# Patient Record
Sex: MALE | Race: WHITE | NOT HISPANIC OR LATINO | URBAN - METROPOLITAN AREA
[De-identification: names, ages, dates, MRNs, and addresses within clinical notes are randomized per-mention and may not be internally consistent; named-entity substitution may affect disease eponyms.]

---

## 2017-03-05 ENCOUNTER — EMERGENCY (EMERGENCY)
Facility: HOSPITAL | Age: 52
LOS: 1 days | Discharge: HOME | End: 2017-03-05
Admitting: EMERGENCY MEDICINE
Payer: MEDICARE
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
## 2017-03-05 DIAGNOSIS — M77.32 CALCANEAL SPUR, LEFT FOOT: Chronic | ICD-10-CM

## 2017-03-05 DIAGNOSIS — F32.9 MAJOR DEPRESSIVE DISORDER, SINGLE EPISODE, UNSPECIFIED: ICD-10-CM
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
## 2017-03-05 DIAGNOSIS — S30.861A INSECT BITE (NONVENOMOUS) OF ABDOMINAL WALL, INITIAL ENCOUNTER: ICD-10-CM
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
## 2017-03-05 PROCEDURE — 99283 EMERGENCY DEPT VISIT LOW MDM: CPT

## 2017-03-27 ENCOUNTER — APPOINTMENT (OUTPATIENT)
Dept: RADIOLOGY | Facility: HOSPITAL | Age: 52
End: 2017-03-27

## 2017-03-27 ENCOUNTER — OUTPATIENT (OUTPATIENT)
Dept: OUTPATIENT SERVICES | Facility: HOSPITAL | Age: 52
LOS: 1 days | End: 2017-03-27
Payer: MEDICARE

## 2017-03-27 DIAGNOSIS — M77.32 CALCANEAL SPUR, LEFT FOOT: Chronic | ICD-10-CM

## 2017-03-27 DIAGNOSIS — N20.0 CALCULUS OF KIDNEY: ICD-10-CM

## 2017-03-27 PROCEDURE — 74018 RADEX ABDOMEN 1 VIEW: CPT

## 2017-07-08 ENCOUNTER — EMERGENCY (EMERGENCY)
Facility: HOSPITAL | Age: 52
LOS: 1 days | Discharge: HOME | End: 2017-07-08
Admitting: EMERGENCY MEDICINE
Payer: MEDICARE

## 2017-07-08 DIAGNOSIS — Z79.899 OTHER LONG TERM (CURRENT) DRUG THERAPY: ICD-10-CM

## 2017-07-08 DIAGNOSIS — F32.9 MAJOR DEPRESSIVE DISORDER, SINGLE EPISODE, UNSPECIFIED: ICD-10-CM

## 2017-07-08 DIAGNOSIS — R10.9 UNSPECIFIED ABDOMINAL PAIN: ICD-10-CM

## 2017-07-08 DIAGNOSIS — M77.32 CALCANEAL SPUR, LEFT FOOT: Chronic | ICD-10-CM

## 2017-07-08 DIAGNOSIS — N20.2 CALCULUS OF KIDNEY WITH CALCULUS OF URETER: ICD-10-CM

## 2017-07-08 DIAGNOSIS — Z79.891 LONG TERM (CURRENT) USE OF OPIATE ANALGESIC: ICD-10-CM

## 2017-07-08 PROCEDURE — 99284 EMERGENCY DEPT VISIT MOD MDM: CPT

## 2017-07-08 PROCEDURE — 74176 CT ABD & PELVIS W/O CONTRAST: CPT | Mod: 26

## 2017-07-09 PROCEDURE — 87086 URINE CULTURE/COLONY COUNT: CPT

## 2017-07-09 PROCEDURE — 85027 COMPLETE CBC AUTOMATED: CPT

## 2017-07-09 PROCEDURE — 74176 CT ABD & PELVIS W/O CONTRAST: CPT

## 2017-07-09 PROCEDURE — 82550 ASSAY OF CK (CPK): CPT

## 2017-07-09 PROCEDURE — 96361 HYDRATE IV INFUSION ADD-ON: CPT

## 2017-07-09 PROCEDURE — 96365 THER/PROPH/DIAG IV INF INIT: CPT

## 2017-07-09 PROCEDURE — 99284 EMERGENCY DEPT VISIT MOD MDM: CPT | Mod: 25

## 2017-07-09 PROCEDURE — 81003 URINALYSIS AUTO W/O SCOPE: CPT

## 2017-07-09 PROCEDURE — 96375 TX/PRO/DX INJ NEW DRUG ADDON: CPT

## 2017-07-09 PROCEDURE — 85730 THROMBOPLASTIN TIME PARTIAL: CPT

## 2017-07-09 PROCEDURE — 85610 PROTHROMBIN TIME: CPT

## 2017-07-09 PROCEDURE — 80053 COMPREHEN METABOLIC PANEL: CPT

## 2017-07-14 ENCOUNTER — APPOINTMENT (OUTPATIENT)
Dept: RADIOLOGY | Facility: HOSPITAL | Age: 52
End: 2017-07-14

## 2017-07-14 ENCOUNTER — OUTPATIENT (OUTPATIENT)
Dept: OUTPATIENT SERVICES | Facility: HOSPITAL | Age: 52
LOS: 1 days | End: 2017-07-14
Payer: MEDICARE

## 2017-07-14 DIAGNOSIS — M77.32 CALCANEAL SPUR, LEFT FOOT: Chronic | ICD-10-CM

## 2017-07-14 PROCEDURE — 74018 RADEX ABDOMEN 1 VIEW: CPT

## 2017-07-31 ENCOUNTER — OUTPATIENT (OUTPATIENT)
Dept: OUTPATIENT SERVICES | Facility: HOSPITAL | Age: 52
LOS: 1 days | End: 2017-07-31
Payer: MEDICARE

## 2017-07-31 DIAGNOSIS — M77.32 CALCANEAL SPUR, LEFT FOOT: Chronic | ICD-10-CM

## 2017-07-31 PROCEDURE — G0463: CPT

## 2017-07-31 PROCEDURE — 93010 ELECTROCARDIOGRAM REPORT: CPT | Mod: NC

## 2017-07-31 PROCEDURE — 93005 ELECTROCARDIOGRAM TRACING: CPT

## 2017-07-31 PROCEDURE — 80048 BASIC METABOLIC PNL TOTAL CA: CPT

## 2017-07-31 PROCEDURE — 81003 URINALYSIS AUTO W/O SCOPE: CPT

## 2017-07-31 PROCEDURE — 87086 URINE CULTURE/COLONY COUNT: CPT

## 2017-07-31 PROCEDURE — 36415 COLL VENOUS BLD VENIPUNCTURE: CPT

## 2017-07-31 PROCEDURE — 85025 COMPLETE CBC W/AUTO DIFF WBC: CPT

## 2017-08-08 ENCOUNTER — OUTPATIENT (OUTPATIENT)
Dept: OUTPATIENT SERVICES | Facility: HOSPITAL | Age: 52
LOS: 1 days | Discharge: HOME | End: 2017-08-08
Payer: MEDICARE

## 2017-08-08 DIAGNOSIS — N20.1 CALCULUS OF URETER: ICD-10-CM

## 2017-08-08 DIAGNOSIS — M77.32 CALCANEAL SPUR, LEFT FOOT: Chronic | ICD-10-CM

## 2017-08-08 PROCEDURE — 52005 CYSTO W/URTRL CATHJ: CPT | Mod: LT

## 2017-08-08 PROCEDURE — 74018 RADEX ABDOMEN 1 VIEW: CPT

## 2017-08-08 PROCEDURE — 74000: CPT | Mod: 26

## 2017-08-09 ENCOUNTER — TRANSCRIPTION ENCOUNTER (OUTPATIENT)
Age: 52
End: 2017-08-09

## 2018-08-31 ENCOUNTER — APPOINTMENT (OUTPATIENT)
Dept: ORTHOPEDIC SURGERY | Facility: CLINIC | Age: 53
End: 2018-08-31
Payer: MEDICARE

## 2018-08-31 VITALS
BODY MASS INDEX: 21.67 KG/M2 | SYSTOLIC BLOOD PRESSURE: 114 MMHG | HEART RATE: 66 BPM | WEIGHT: 160 LBS | TEMPERATURE: 98 F | DIASTOLIC BLOOD PRESSURE: 74 MMHG | HEIGHT: 72 IN

## 2018-08-31 DIAGNOSIS — Z78.9 OTHER SPECIFIED HEALTH STATUS: ICD-10-CM

## 2018-08-31 DIAGNOSIS — I49.3 VENTRICULAR PREMATURE DEPOLARIZATION: ICD-10-CM

## 2018-08-31 DIAGNOSIS — Z80.9 FAMILY HISTORY OF MALIGNANT NEOPLASM, UNSPECIFIED: ICD-10-CM

## 2018-08-31 PROCEDURE — 99204 OFFICE O/P NEW MOD 45 MIN: CPT

## 2018-08-31 PROCEDURE — 73610 X-RAY EXAM OF ANKLE: CPT | Mod: LT

## 2018-09-24 ENCOUNTER — APPOINTMENT (OUTPATIENT)
Dept: ORTHOPEDIC SURGERY | Facility: CLINIC | Age: 53
End: 2018-09-24
Payer: MEDICARE

## 2018-09-24 PROCEDURE — 99213 OFFICE O/P EST LOW 20 MIN: CPT

## 2018-09-24 PROCEDURE — 73610 X-RAY EXAM OF ANKLE: CPT | Mod: LT

## 2018-11-13 ENCOUNTER — APPOINTMENT (OUTPATIENT)
Dept: FAMILY MEDICINE | Facility: CLINIC | Age: 53
End: 2018-11-13
Payer: MEDICARE

## 2018-11-13 VITALS
SYSTOLIC BLOOD PRESSURE: 101 MMHG | RESPIRATION RATE: 17 BRPM | HEIGHT: 75 IN | DIASTOLIC BLOOD PRESSURE: 64 MMHG | WEIGHT: 159 LBS | TEMPERATURE: 98 F | BODY MASS INDEX: 19.77 KG/M2 | HEART RATE: 68 BPM | OXYGEN SATURATION: 97 %

## 2018-11-13 DIAGNOSIS — Z80.8 FAMILY HISTORY OF MALIGNANT NEOPLASM OF OTHER ORGANS OR SYSTEMS: ICD-10-CM

## 2018-11-13 DIAGNOSIS — Z80.0 FAMILY HISTORY OF MALIGNANT NEOPLASM OF DIGESTIVE ORGANS: ICD-10-CM

## 2018-11-13 PROCEDURE — 99203 OFFICE O/P NEW LOW 30 MIN: CPT

## 2018-11-13 NOTE — HISTORY OF PRESENT ILLNESS
[FreeTextEntry8] : here to establish pcp\par sees dr. Whitney, has anxiety , insomnia, has him write klonopin\par concerend as his joints are achy , left shoulder, right shoulder, left ankle, right elbow\par for many years, has seen orthopedics in past however still has joint pain over the years\par currently left ankle pain and left elbow pain is worse\par hurt left ankle playing ball 32 years ago, had surgery may years ago, but still has pain on and off \par wants information on [place] doctor who uses alternative treatment for scar tissue repair

## 2018-11-13 NOTE — PHYSICAL EXAM
[No Acute Distress] : no acute distress [Well Nourished] : well nourished [Well Developed] : well developed [Normal Sclera/Conjunctiva] : normal sclera/conjunctiva [PERRL] : pupils equal round and reactive to light [EOMI] : extraocular movements intact [Normal Outer Ear/Nose] : the outer ears and nose were normal in appearance [No Joint Swelling] : no joint swelling [Grossly Normal Strength/Tone] : grossly normal strength/tone [de-identified] : left ankle from, no erytjhema, brusing or deformity. left elbow from no point tenderness joint deformity noted

## 2018-11-13 NOTE — ASSESSMENT
[FreeTextEntry1] : advised patient to follow up for consult on platelet transfusion as recommeneded by ortho\par trial of glucosamine/chondroitan\par cont PT as per ortho and stretching exercises at home \par routine labs, follow up for cpe\par should see psych for renewals of klonopin etc

## 2018-11-15 ENCOUNTER — EMERGENCY (EMERGENCY)
Facility: HOSPITAL | Age: 53
LOS: 1 days | Discharge: HOME | End: 2018-11-15
Attending: EMERGENCY MEDICINE | Admitting: EMERGENCY MEDICINE
Payer: MEDICARE

## 2018-11-15 VITALS
OXYGEN SATURATION: 98 % | HEART RATE: 73 BPM | TEMPERATURE: 98 F | RESPIRATION RATE: 16 BRPM | SYSTOLIC BLOOD PRESSURE: 101 MMHG | WEIGHT: 160.06 LBS | DIASTOLIC BLOOD PRESSURE: 56 MMHG

## 2018-11-15 DIAGNOSIS — M77.32 CALCANEAL SPUR, LEFT FOOT: Chronic | ICD-10-CM

## 2018-11-15 PROCEDURE — 99283 EMERGENCY DEPT VISIT LOW MDM: CPT

## 2018-11-15 PROCEDURE — 99282 EMERGENCY DEPT VISIT SF MDM: CPT

## 2018-11-15 NOTE — ED PROVIDER NOTE - OBJECTIVE STATEMENT
54 yo with history of depression and now insomnia over the last few years worse the last week or so.  States he has been prescribed Klonopin and Trazadone without improvement although has only taken sporadically.  Denies any change in life.  Able to function during day and is not tired but does not like being up all night.  Denies any impulsiveness or problems in his personal life.  No SI HI AH VH.  No drug use.

## 2018-11-15 NOTE — ED PROVIDER NOTE - MEDICAL DECISION MAKING DETAILS
pt with insomnia.  Could be related to being in a hypomanic state relating to his underlying psych disease.  Does not pose threat to self or others and already has a psych that he can follow up with.  Is requesting ambien and lunesta but explained not a medication that would be started in the ED.  Extensive education about sleep hygiene.

## 2018-11-15 NOTE — ED PROVIDER NOTE - NSFOLLOWUPINSTRUCTIONS_ED_ALL_ED_FT
1. TAKE ALL MEDICATIONS AS DIRECTED.    2. FOR PAIN OR FEVER YOU CAN TAKE IBUPROFEN (MOTRIN, ADVIL) OR ACETAMINOPHEN (TYLENOL) AS NEEDED, AS DIRECTED ON PACKAGING.  3. FOLLOW UP WITH YOUR PRIMARY DOCTOR WITHIN 5 DAYS AS DIRECTED.  4. IF YOU HAD LABS OR IMAGING DONE, YOU WERE GIVEN COPIES OF ALL LABS AND/OR IMAGING RESULTS FROM YOUR ER VISIT--PLEASE TAKE THEM WITH YOU TO YOUR FOLLOW UP APPOINTMENTS.  5. IF NEEDED, CALL PATIENT ACCESS SERVICES AT [phone] TO FIND A PRIMARY CARE PHYSICIAN.    6. RETURN TO THE ER FOR ANY WORSENING SYMPTOMS OR CONCERNS.     Practice attached sleep guidelines and follow up with your psych this week

## 2018-11-15 NOTE — ED ADULT NURSE NOTE - NSIMPLEMENTINTERV_GEN_ALL_ED
Implemented All Universal Safety Interventions:  [place] to call system. Call bell, personal items and telephone within reach. Instruct patient to call for assistance. Room bathroom lighting operational. Non-slip footwear when patient is off stretcher. Physically safe environment: no spills, clutter or unnecessary equipment. Stretcher in lowest position, wheels locked, appropriate side rails in place.

## 2018-11-15 NOTE — ED PROVIDER NOTE - PHYSICAL EXAMINATION
Gen: Well appearing in NAD  Head: NC/AT  Neck: trachea midline  Resp:  No distress  Ext: no deformities  Neuro:  A&O appears non focal  Skin:  Warm and dry as visualized  Psych:  Odd affect, no SI HI AH VH, normal non pressured speech.

## 2018-11-20 ENCOUNTER — APPOINTMENT (OUTPATIENT)
Dept: FAMILY MEDICINE | Facility: CLINIC | Age: 53
End: 2018-11-20
Payer: MEDICARE

## 2018-11-20 VITALS
SYSTOLIC BLOOD PRESSURE: 93 MMHG | DIASTOLIC BLOOD PRESSURE: 63 MMHG | TEMPERATURE: 98 F | OXYGEN SATURATION: 98 % | BODY MASS INDEX: 20.14 KG/M2 | RESPIRATION RATE: 17 BRPM | HEART RATE: 70 BPM | HEIGHT: 75 IN | WEIGHT: 162 LBS

## 2018-11-20 LAB
ALBUMIN SERPL ELPH-MCNC: 4.5 G/DL
ALP BLD-CCNC: 89 U/L
ALT SERPL-CCNC: 19 U/L
ANION GAP SERPL CALC-SCNC: 10 MMOL/L
AST SERPL-CCNC: 18 U/L
BASOPHILS # BLD AUTO: 0.03 K/UL
BASOPHILS NFR BLD AUTO: 0.6 %
BILIRUB SERPL-MCNC: 0.3 MG/DL
BUN SERPL-MCNC: 18 MG/DL
CALCIUM SERPL-MCNC: 9.7 MG/DL
CHLORIDE SERPL-SCNC: 105 MMOL/L
CHOLEST SERPL-MCNC: 145 MG/DL
CHOLEST/HDLC SERPL: 3.5 RATIO
CO2 SERPL-SCNC: 29 MMOL/L
CREAT SERPL-MCNC: 1.1 MG/DL
CRP SERPL-MCNC: <0.1 MG/DL
EOSINOPHIL # BLD AUTO: 0.13 K/UL
EOSINOPHIL NFR BLD AUTO: 2.8 %
GLUCOSE SERPL-MCNC: 93 MG/DL
HCT VFR BLD CALC: 39.2 %
HDLC SERPL-MCNC: 41 MG/DL
HGB BLD-MCNC: 14.3 G/DL
IMM GRANULOCYTES NFR BLD AUTO: 0.2 %
LDLC SERPL CALC-MCNC: 77 MG/DL
LYMPHOCYTES # BLD AUTO: 1.33 K/UL
LYMPHOCYTES NFR BLD AUTO: 28.2 %
MAN DIFF?: NORMAL
MCHC RBC-ENTMCNC: 31.1 PG
MCHC RBC-ENTMCNC: 36.5 GM/DL
MCV RBC AUTO: 85.2 FL
MONOCYTES # BLD AUTO: 0.26 K/UL
MONOCYTES NFR BLD AUTO: 5.5 %
NEUTROPHILS # BLD AUTO: 2.95 K/UL
NEUTROPHILS NFR BLD AUTO: 62.7 %
PLATELET # BLD AUTO: 153 K/UL
POTASSIUM SERPL-SCNC: 4.4 MMOL/L
PROT SERPL-MCNC: 6.7 G/DL
RBC # BLD: 4.6 M/UL
RBC # FLD: 12.8 %
RHEUMATOID FACT SER QL: 17 IU/ML
SODIUM SERPL-SCNC: 144 MMOL/L
TRIGL SERPL-MCNC: 135 MG/DL
TSH SERPL-ACNC: 0.61 UIU/ML
WBC # FLD AUTO: 4.71 K/UL

## 2018-11-20 PROCEDURE — 99214 OFFICE O/P EST MOD 30 MIN: CPT

## 2018-11-21 ENCOUNTER — MOBILE ON CALL (OUTPATIENT)
Age: 53
End: 2018-11-21

## 2018-11-21 LAB
ANA PAT FLD IF-IMP: ABNORMAL
ANA SER IF-ACNC: ABNORMAL

## 2018-11-21 NOTE — HISTORY OF PRESENT ILLNESS
[FreeTextEntry1] : elbow pain [de-identified] : patient presents with elbow pain right arm, worse while opening jars\par also has had ankle pain for years\par does have some achiness in all joints as discussed at previous visit, has seen orthopeidcs in past .patient\par wants to discuss scar tissue repair , and has info on doctor in [place]\par

## 2018-11-21 NOTE — REVIEW OF SYSTEMS
[Joint Pain] : joint pain [Joint Stiffness] : joint stiffness [Muscle Pain] : muscle pain [Joint Swelling] : no joint swelling [Negative] : Gastrointestinal

## 2018-11-21 NOTE — ASSESSMENT
[FreeTextEntry1] : will awaint further rheumatology workup\par alleve prn for paiin\par \par \par addendum, 11/21, reviewed labs, jaycob, rf elevated discussed with patient will follow with rheumatology

## 2018-11-21 NOTE — PHYSICAL EXAM
[No Acute Distress] : no acute distress [Normal Sclera/Conjunctiva] : normal sclera/conjunctiva [PERRL] : pupils equal round and reactive to light [Normal Outer Ear/Nose] : the outer ears and nose were normal in appearance [Normal Oropharynx] : the oropharynx was normal [No Respiratory Distress] : no respiratory distress  [Clear to Auscultation] : lungs were clear to auscultation bilaterally [Normal Rate] : normal rate  [Regular Rhythm] : with a regular rhythm [Normal S1, S2] : normal S1 and S2 [No Edema] : there was no peripheral edema [de-identified] : right elbow from on flexion extension no swelling or joint deformity noted [de-identified] : altered affect

## 2018-12-11 ENCOUNTER — APPOINTMENT (OUTPATIENT)
Dept: ORTHOPEDIC SURGERY | Facility: CLINIC | Age: 53
End: 2018-12-11
Payer: MEDICARE

## 2018-12-11 PROCEDURE — 99213 OFFICE O/P EST LOW 20 MIN: CPT

## 2019-02-15 ENCOUNTER — LABORATORY RESULT (OUTPATIENT)
Age: 54
End: 2019-02-15

## 2019-02-15 ENCOUNTER — APPOINTMENT (OUTPATIENT)
Dept: RHEUMATOLOGY | Facility: CLINIC | Age: 54
End: 2019-02-15
Payer: MEDICARE

## 2019-02-15 VITALS
OXYGEN SATURATION: 98 % | HEIGHT: 75 IN | HEART RATE: 63 BPM | TEMPERATURE: 97.7 F | WEIGHT: 165 LBS | SYSTOLIC BLOOD PRESSURE: 108 MMHG | DIASTOLIC BLOOD PRESSURE: 70 MMHG | BODY MASS INDEX: 20.51 KG/M2

## 2019-02-15 LAB — ERYTHROCYTE [SEDIMENTATION RATE] IN BLOOD BY WESTERGREN METHOD: 3 MM/HR

## 2019-02-15 PROCEDURE — 99203 OFFICE O/P NEW LOW 30 MIN: CPT

## 2019-02-15 NOTE — PHYSICAL EXAM
[General Appearance - Alert] : alert [General Appearance - In No Acute Distress] : in no acute distress [Auscultation Breath Sounds / Voice Sounds] : lungs were clear to auscultation bilaterally [Heart Rate And Rhythm] : heart rate was normal and rhythm regular [Heart Sounds] : normal S1 and S2 [Heart Sounds Gallop] : no gallops [Murmurs] : no murmurs [Heart Sounds Pericardial Friction Rub] : no pericardial rub [Full Pulse] : the pedal pulses are present [Edema] : there was no peripheral edema [Bowel Sounds] : normal bowel sounds [Abdomen Soft] : soft [Abdomen Tenderness] : non-tender [] : no hepato-splenomegaly [Abdomen Mass (___ Cm)] : no abdominal mass palpated [Cervical Lymph Nodes Enlarged Posterior Bilaterally] : posterior cervical [Cervical Lymph Nodes Enlarged Anterior Bilaterally] : anterior cervical [Supraclavicular Lymph Nodes Enlarged Bilaterally] : supraclavicular [No CVA Tenderness] : no ~M costovertebral angle tenderness [No Spinal Tenderness] : no spinal tenderness [Abnormal Walk] : normal gait [Nail Clubbing] : no clubbing  or cyanosis of the fingernails [Musculoskeletal - Swelling] : no joint swelling seen [Motor Tone] : muscle strength and tone were normal [Oriented To Time, Place, And Person] : oriented to person, place, and time [Impaired Insight] : insight and judgment were intact [Affect] : the affect was normal

## 2019-02-22 LAB
B BURGDOR IGG+IGM SER QL IB: NORMAL
CCP AB SER IA-ACNC: 13 UNITS
CRP SERPL-MCNC: <0.1 MG/DL
RF+CCP IGG SER-IMP: NEGATIVE
RHEUMATOID FACT SER QL: 19 IU/ML

## 2019-02-24 NOTE — ASSESSMENT
[FreeTextEntry1] : 53 year-old male with multiple joint complains related to previous injuries with mildly elevated GILBERTO and RF\par no signs of a autoimmune condition\par given positive GILBERTO and RF add rheum work-up\par \par patient to call in 1 week to discuss results and next steps\par \par will need PT for joint injuries

## 2019-02-24 NOTE — HISTORY OF PRESENT ILLNESS
[FreeTextEntry1] : Patient with multiple joint injuries -over the years seen by ortho in 11/2018 - labs done with positive GILBERTO 1:80 and low titer RF 17\par Affected joints are shoulders pain is intermittent and related to activity\par Hips and knees - also related to exercise and activity\par Denies any swelling\par Takes ibuprofen as needed for pain - rarely \par No pain at rest - only after exertion\par \par Denies any fevers, chill, rashes, weight loss,fatigue,  hair loss,  dry eyes or mouth, mouth sores, Raynaud's. chest pain or SOB, GI or , numbness/tingling\par

## 2019-03-11 ENCOUNTER — APPOINTMENT (OUTPATIENT)
Dept: FAMILY MEDICINE | Facility: CLINIC | Age: 54
End: 2019-03-11
Payer: MEDICARE

## 2019-03-11 VITALS
BODY MASS INDEX: 22.36 KG/M2 | WEIGHT: 165.06 LBS | SYSTOLIC BLOOD PRESSURE: 108 MMHG | RESPIRATION RATE: 12 BRPM | HEART RATE: 68 BPM | HEIGHT: 72 IN | DIASTOLIC BLOOD PRESSURE: 60 MMHG | OXYGEN SATURATION: 97 %

## 2019-03-11 DIAGNOSIS — M25.529 PAIN IN UNSPECIFIED ELBOW: ICD-10-CM

## 2019-03-11 PROCEDURE — 99213 OFFICE O/P EST LOW 20 MIN: CPT | Mod: 25

## 2019-03-11 NOTE — ASSESSMENT
[FreeTextEntry1] : omt to right shoulder using johnny techniques, blt myofascial\par increased rom after treatment\par advised patient to use advil prn, avoid repetitive motion, reassurance\par if pain continues will follow up in a few weeks\par PT for joint pain

## 2019-03-11 NOTE — PHYSICAL EXAM
[No Acute Distress] : no acute distress [No Respiratory Distress] : no respiratory distress  [Clear to Auscultation] : lungs were clear to auscultation bilaterally [No Accessory Muscle Use] : no accessory muscle use [No Edema] : there was no peripheral edema [de-identified] : right shoulder from on motion testing, left shoulder from on motion testing

## 2019-03-11 NOTE — HISTORY OF PRESENT ILLNESS
[FreeTextEntry8] : right shoulder pain x 2 weeks, no injury but history of right shoulder tendonitis\par recently saw rheumatology  for postive jaycob, low titre rf\par referred to PT, no signs of autoimmune condition as per rheumatoology consult

## 2019-04-01 ENCOUNTER — APPOINTMENT (OUTPATIENT)
Dept: FAMILY MEDICINE | Facility: CLINIC | Age: 54
End: 2019-04-01
Payer: MEDICARE

## 2019-04-01 VITALS
SYSTOLIC BLOOD PRESSURE: 94 MMHG | WEIGHT: 165.38 LBS | HEART RATE: 71 BPM | HEIGHT: 72 IN | OXYGEN SATURATION: 100 % | RESPIRATION RATE: 12 BRPM | BODY MASS INDEX: 22.4 KG/M2 | DIASTOLIC BLOOD PRESSURE: 60 MMHG

## 2019-04-01 PROCEDURE — 99214 OFFICE O/P EST MOD 30 MIN: CPT

## 2019-04-02 NOTE — HISTORY OF PRESENT ILLNESS
[FreeTextEntry1] : here for refills [de-identified] : states he is not able to see his pscyhiatrist dr chamberlain and needs refills for trazadone for insmonia and klonopin which he takes\par occasinaolly for depression\par also on prozac but not sure of his dose\par states he still has klonopin  left\par

## 2019-04-02 NOTE — HEALTH RISK ASSESSMENT
[] : Yes [No falls in past year] : Patient reported no falls in the past year [1] : 2) Feeling down, depressed, or hopeless for several days (1)

## 2019-04-02 NOTE — PHYSICAL EXAM
[No Respiratory Distress] : no respiratory distress  [Normal Rate] : normal rate  [de-identified] : discheveled

## 2019-04-02 NOTE — ASSESSMENT
[FreeTextEntry1] : for now will refill trazadone\par will obtain dose of prozac and call back office\par patient has difficulty with transportaion, will refill his meds until he can get into psych

## 2019-04-23 ENCOUNTER — EMERGENCY (EMERGENCY)
Facility: HOSPITAL | Age: 54
LOS: 1 days | Discharge: HOME | End: 2019-04-23
Attending: INTERNAL MEDICINE | Admitting: INTERNAL MEDICINE
Payer: MEDICARE

## 2019-04-23 VITALS
SYSTOLIC BLOOD PRESSURE: 105 MMHG | OXYGEN SATURATION: 95 % | WEIGHT: 160.06 LBS | RESPIRATION RATE: 18 BRPM | DIASTOLIC BLOOD PRESSURE: 68 MMHG | TEMPERATURE: 97 F | HEART RATE: 70 BPM

## 2019-04-23 DIAGNOSIS — M77.32 CALCANEAL SPUR, LEFT FOOT: Chronic | ICD-10-CM

## 2019-04-23 DIAGNOSIS — S70.261A INSECT BITE (NONVENOMOUS), RIGHT HIP, INITIAL ENCOUNTER: ICD-10-CM

## 2019-04-23 PROCEDURE — 86618 LYME DISEASE ANTIBODY: CPT

## 2019-04-23 PROCEDURE — 86753 PROTOZOA ANTIBODY NOS: CPT

## 2019-04-23 PROCEDURE — 86666 EHRLICHIA ANTIBODY: CPT

## 2019-04-23 PROCEDURE — 86757 RICKETTSIA ANTIBODY: CPT

## 2019-04-23 PROCEDURE — 36415 COLL VENOUS BLD VENIPUNCTURE: CPT

## 2019-04-23 PROCEDURE — 99283 EMERGENCY DEPT VISIT LOW MDM: CPT

## 2019-04-23 RX ORDER — CEFUROXIME AXETIL 250 MG
1 TABLET ORAL
Qty: 28 | Refills: 0
Start: 2019-04-23 | End: 2019-05-06

## 2019-04-23 NOTE — ED PROVIDER NOTE - CARE PROVIDER_API CALL
Darcy Carrington (DO)  Family Medicine  [address] [place] [address]  Phone: [phone]  Fax: [phone]  Follow Up Time:

## 2019-04-23 NOTE — ED PROVIDER NOTE - OBJECTIVE STATEMENT
54 yo M denies pmHx here today for tick bite to rt hip since yesterday. Patient states he was in a wooded area yesterday and noticed an insect bite on his right waistline this afternoon. Patient states he was able to remove the tick with tweezers and has brought it to the ED with him today. He notes some localized redness and itching around insect bite. Patient notes history of prior tick bite before in the past denies prior tick born disease diagnosis. He denies any fever, chills, abdominal pain, nausea, vomiting, or diarrhea.

## 2019-04-23 NOTE — ED PROVIDER NOTE - ATTENDING CONTRIBUTION TO CARE
54 yo M denies pmHx here today for tick bite to rt hip since yesterday. Patient states he was in a wooded area yesterday and noticed an insect bite on his right waistline this afternoon. Patient states he was able to remove the tick with tweezers and has brought it to the ED with him today. He notes some localized redness and itching around insect bite. Patient notes history of prior tick bite before in the past denies prior tick born disease diagnosis. He denies any fever, chills, abdominal pain, nausea, vomiting, or diarrhea.  pe tick bite site R lower back with surrounding erythema   Dr. Richter:  I have reviewed and discussed with the PA/ resident the case specifics, including the history, physical assessment, evaluation, conclusion, laboratory results, and medical plan. I agree with the contents, and conclusions. I have personally examined, and interviewed the patient.

## 2019-04-23 NOTE — ED PROVIDER NOTE - NSFOLLOWUPINSTRUCTIONS_ED_ALL_ED_FT
Take prescribed medications as directed. We will contact you with your test results at they become available to us. You should contact your primary doctor to make an appointment as soon as possible.

## 2019-04-23 NOTE — ED ADULT NURSE NOTE - NSIMPLEMENTINTERV_GEN_ALL_ED
Implemented All Universal Safety Interventions:  [place] to call system. Call bell, personal items and telephone within reach. Instruct patient to call for assistance. Room bathroom lighting operational. Non-slip footwear when patient is off stretcher. Physically safe environment: no spills, clutter or unnecessary equipment. Stretcher in lowest position, wheels locked, appropriate side rails in place.

## 2019-04-23 NOTE — ED PROVIDER NOTE - CLINICAL SUMMARY MEDICAL DECISION MAKING FREE TEXT BOX
Uncomplicated tick bite. Will draw tick panel, and give ppx doxy BID x 14 - instructed patient to f/u PCP

## 2019-04-23 NOTE — ED ADULT NURSE NOTE - OBJECTIVE STATEMENT
Pt came to ED with c/o tick bite. Pt found tick on right side of hip about an hour before coming to ED. Pt Pt came to ED with c/o tick bite. Pt found tick on right side of hip about an hour before coming to ED. Pt believes that the tick may have been there for "about a day." Pt denies any pain at site, fever or chills. Redness noted at site of tick bite.

## 2019-04-25 LAB
A PHAGOCYTOPH IGG TITR SER IF: SIGNIFICANT CHANGE UP TITER
B BURGDOR AB SER QL IA: NEGATIVE — SIGNIFICANT CHANGE UP
B MICROTI IGG TITR SER: SIGNIFICANT CHANGE UP TITER
E CHAFFEENSIS IGG TITR SER IF: SIGNIFICANT CHANGE UP TITER

## 2019-04-26 LAB
R RICKETTSI AB SER-ACNC: NEGATIVE — SIGNIFICANT CHANGE UP
R RICKETTSI IGM SER-ACNC: 0.99 INDEX — HIGH (ref 0–0.89)
RICK SF IGG TITR SER IF: NEGATIVE — SIGNIFICANT CHANGE UP
RICK SF IGM TITR SER IF: 0.99 INDEX — HIGH (ref 0–0.89)

## 2019-04-29 ENCOUNTER — APPOINTMENT (OUTPATIENT)
Dept: FAMILY MEDICINE | Facility: CLINIC | Age: 54
End: 2019-04-29
Payer: MEDICARE

## 2019-04-29 VITALS
RESPIRATION RATE: 12 BRPM | DIASTOLIC BLOOD PRESSURE: 62 MMHG | WEIGHT: 162.56 LBS | BODY MASS INDEX: 22.02 KG/M2 | HEIGHT: 72 IN | HEART RATE: 67 BPM | TEMPERATURE: 97.8 F | OXYGEN SATURATION: 98 % | SYSTOLIC BLOOD PRESSURE: 90 MMHG
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
## 2019-04-29 PROCEDURE — 99214 OFFICE O/P EST MOD 30 MIN: CPT
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
## 2019-04-30 NOTE — DATA REVIEWED
[FreeTextEntry1] : reviewed discharged [facility] er\par reviewed labs, er visit + steven mountain spotted fever

## 2019-04-30 NOTE — PHYSICAL EXAM
[No Acute Distress] : no acute distress [Normal Outer Ear/Nose] : the outer ears and nose were normal in appearance [Normal Oropharynx] : the oropharynx was normal [No JVD] : no jugular venous distention [No Respiratory Distress] : no respiratory distress  [de-identified] : noted punctate lesion left trunk, no surrounding erythema, erythema migrans or signs of infection

## 2019-04-30 NOTE — HISTORY OF PRESENT ILLNESS
[FreeTextEntry1] : s/p er visit for tick bite [de-identified] : seen in er and placed on antibiotics, antibiotics were changed patient unsure of which he should take\par feels well overall, no fevers, chills, malaise or pain at site of tick bite on left trunk

## 2019-08-13 ENCOUNTER — APPOINTMENT (OUTPATIENT)
Dept: RHEUMATOLOGY | Facility: CLINIC | Age: 54
End: 2019-08-13
Payer: MEDICARE

## 2019-08-13 VITALS
WEIGHT: 160 LBS | HEART RATE: 64 BPM | BODY MASS INDEX: 21.67 KG/M2 | OXYGEN SATURATION: 99 % | DIASTOLIC BLOOD PRESSURE: 62 MMHG | HEIGHT: 72 IN | TEMPERATURE: 98 F | SYSTOLIC BLOOD PRESSURE: 99 MMHG

## 2019-08-13 DIAGNOSIS — R76.8 OTHER SPECIFIED ABNORMAL IMMUNOLOGICAL FINDINGS IN SERUM: ICD-10-CM

## 2019-08-13 PROCEDURE — 99213 OFFICE O/P EST LOW 20 MIN: CPT

## 2019-08-27 ENCOUNTER — APPOINTMENT (OUTPATIENT)
Dept: FAMILY MEDICINE | Facility: CLINIC | Age: 54
End: 2019-08-27
Payer: MEDICARE

## 2019-08-27 VITALS
DIASTOLIC BLOOD PRESSURE: 68 MMHG | HEART RATE: 70 BPM | OXYGEN SATURATION: 95 % | WEIGHT: 157.44 LBS | RESPIRATION RATE: 12 BRPM | SYSTOLIC BLOOD PRESSURE: 110 MMHG | BODY MASS INDEX: 21.32 KG/M2 | HEIGHT: 72 IN

## 2019-08-27 DIAGNOSIS — M25.672 STIFFNESS OF LEFT ANKLE, NOT ELSEWHERE CLASSIFIED: ICD-10-CM

## 2019-08-27 DIAGNOSIS — Z86.010 PERSONAL HISTORY OF COLONIC POLYPS: ICD-10-CM

## 2019-08-27 DIAGNOSIS — N20.0 CALCULUS OF KIDNEY: ICD-10-CM

## 2019-08-27 DIAGNOSIS — M25.50 PAIN IN UNSPECIFIED JOINT: ICD-10-CM

## 2019-08-27 DIAGNOSIS — N46.9 MALE INFERTILITY, UNSPECIFIED: ICD-10-CM

## 2019-08-27 PROCEDURE — 99214 OFFICE O/P EST MOD 30 MIN: CPT

## 2019-08-27 RX ORDER — CLONAZEPAM 1 MG/1
1 TABLET ORAL
Qty: 30 | Refills: 0 | Status: COMPLETED | COMMUNITY
End: 2019-08-27

## 2019-08-27 RX ORDER — FLUOXETINE HYDROCHLORIDE 20 MG/1
20 CAPSULE ORAL
Qty: 90 | Refills: 0 | Status: DISCONTINUED | COMMUNITY
Start: 2019-04-04 | End: 2019-08-27

## 2019-08-27 NOTE — PHYSICAL EXAM
[No Acute Distress] : no acute distress [Normal] : no carotid or abdominal bruits heard, no varicosities, pedal pulses are present, no peripheral edema, no extremity clubbing or cyanosis and no palpable aorta

## 2019-08-27 NOTE — HISTORY OF PRESENT ILLNESS
[FreeTextEntry1] : here with questions [de-identified] : has various questions about shoulde 5 years r and ankle surgery 30 years ago and whether scarring could possiblycause permaent damage. even if no signs of symptoms

## 2019-08-27 NOTE — ASSESSMENT
[FreeTextEntry1] : patient advised unlikely to have lasting internal damage from previous surgery\par no signs of autoimmune condidtion, recently saw rheumatologist\par reassurance provided\par patient not in pain however\par may see ortho if having shoulder pain

## 2019-09-06 ENCOUNTER — APPOINTMENT (OUTPATIENT)
Dept: ORTHOPEDIC SURGERY | Facility: CLINIC | Age: 54
End: 2019-09-06
Payer: MEDICARE

## 2019-09-06 VITALS — WEIGHT: 157 LBS | BODY MASS INDEX: 21.26 KG/M2 | HEIGHT: 72 IN

## 2019-09-06 PROCEDURE — 99213 OFFICE O/P EST LOW 20 MIN: CPT

## 2019-09-06 PROCEDURE — 73030 X-RAY EXAM OF SHOULDER: CPT | Mod: 50

## 2019-10-04 ENCOUNTER — RX RENEWAL (OUTPATIENT)
Age: 54
End: 2019-10-04

## 2019-10-15 ENCOUNTER — APPOINTMENT (OUTPATIENT)
Dept: FAMILY MEDICINE | Facility: CLINIC | Age: 54
End: 2019-10-15
Payer: MEDICARE

## 2019-10-15 VITALS
RESPIRATION RATE: 12 BRPM | DIASTOLIC BLOOD PRESSURE: 74 MMHG | HEART RATE: 79 BPM | WEIGHT: 158.44 LBS | OXYGEN SATURATION: 97 % | HEIGHT: 72 IN | SYSTOLIC BLOOD PRESSURE: 94 MMHG | BODY MASS INDEX: 21.46 KG/M2

## 2019-10-15 PROCEDURE — 99213 OFFICE O/P EST LOW 20 MIN: CPT

## 2019-10-15 NOTE — PHYSICAL EXAM
[No Acute Distress] : no acute distress [No Varicosities] : no varicosities [Pedal Pulses Present] : the pedal pulses are present [No Edema] : there was no peripheral edema [Normal] : no carotid or abdominal bruits heard, no varicosities, pedal pulses are present, no peripheral edema, no extremity clubbing or cyanosis and no palpable aorta [de-identified] : dissheveled [de-identified] : altered affect

## 2019-10-15 NOTE — HISTORY OF PRESENT ILLNESS
[FreeTextEntry1] : varicose veins in legs [de-identified] : patient states he sometimes noticed bulging veins in lower legs, not painful and no swelling in legs or feet\par currently does not have any visible, but says sometimes they "come out" and wants to inqure about compression stockings.\par also has question about spravato nasal spray for depression\par states he has been on other meds in past for depression such as zoloft, prozac , effexxor etc and they do not work for him.

## 2019-10-15 NOTE — ASSESSMENT
[FreeTextEntry1] : no visible varicosities, no swelling\par reassurance, if patient notices "bulging veins " at times, compression stocking not indicated

## 2019-11-14 ENCOUNTER — APPOINTMENT (OUTPATIENT)
Dept: FAMILY MEDICINE | Facility: CLINIC | Age: 54
End: 2019-11-14

## 2020-01-06 ENCOUNTER — APPOINTMENT (OUTPATIENT)
Dept: FAMILY MEDICINE | Facility: CLINIC | Age: 55
End: 2020-01-06
Payer: MEDICARE

## 2020-01-06 VITALS
WEIGHT: 165.19 LBS | OXYGEN SATURATION: 98 % | HEART RATE: 71 BPM | SYSTOLIC BLOOD PRESSURE: 90 MMHG | BODY MASS INDEX: 22.37 KG/M2 | DIASTOLIC BLOOD PRESSURE: 60 MMHG | HEIGHT: 72 IN | RESPIRATION RATE: 16 BRPM

## 2020-01-06 VITALS — DIASTOLIC BLOOD PRESSURE: 60 MMHG | SYSTOLIC BLOOD PRESSURE: 98 MMHG

## 2020-01-06 PROCEDURE — 99213 OFFICE O/P EST LOW 20 MIN: CPT

## 2020-01-06 NOTE — PHYSICAL EXAM
[Normal] : normal gait, coordination grossly intact, no focal deficits and deep tendon reflexes were 2+ and symmetric [de-identified] : Full range of motion of right shoulder. Negative empty can or painful arc

## 2020-01-06 NOTE — HISTORY OF PRESENT ILLNESS
[FreeTextEntry8] : Patient presents to office because he wants a SLAP surgery performed for his right glenoid tear that he experienced while lifting weights five years ago. He notes he tore both labrums and had surgery by Dr. Guerrero on his left. He reports having MRIs performed years prior which showed the tear. He denies any pain in right shoulder but notes significant clicking and stiffness  at times. \par \par

## 2020-01-10 ENCOUNTER — APPOINTMENT (OUTPATIENT)
Dept: ORTHOPEDIC SURGERY | Facility: CLINIC | Age: 55
End: 2020-01-10
Payer: MEDICARE

## 2020-01-10 VITALS — WEIGHT: 160 LBS | HEIGHT: 72 IN | BODY MASS INDEX: 21.67 KG/M2

## 2020-01-10 DIAGNOSIS — S43.431A SUPERIOR GLENOID LABRUM LESION OF RIGHT SHOULDER, INITIAL ENCOUNTER: ICD-10-CM

## 2020-01-10 PROCEDURE — 99214 OFFICE O/P EST MOD 30 MIN: CPT

## 2020-01-10 NOTE — END OF VISIT
[FreeTextEntry3] : All medical record entries made by the Brendaibwendy were at my, Dr. Jose Monae, direction and personally dictated by me on 01/10/2020. I have reviewed the chart and agree that the record accurately reflects my personal performance of the history, physical exam, assessment and plan. I have also personally directed, reviewed, and agreed with the chart.

## 2020-01-10 NOTE — ADDENDUM
[FreeTextEntry1] : I, Katherine Matias, acted solely as a scribe for Dr. Jose Monae on this date 01/10/2020.

## 2020-01-10 NOTE — PHYSICAL EXAM
[Normal Touch] : sensation intact for touch [Normal RUE] : Right Upper Extremity: No scars, rashes, lesions, ulcers, skin intact [Normal] : No swelling, no edema, normal pedal pulses and normal temperature [Normal LUE] : Left Upper Extremity: No scars, rashes, lesions, ulcers, skin intact [Poor Appearance] : well-appearing [Acute Distress] : not in acute distress [Obese] : not obese [de-identified] : Right Upper Extremity\par o Shoulder :\par ¦ Inspection/Palpation : no tenderness, no swelling, no deformities\par ¦ Range of Motion : ACTIVE FORWARD ELEVATION: Measured at 150 degrees, ACTIVE EXTERNAL ROTATION: Measured at 80 degrees, ACTIVE INTERNAL ROTATION: Measured at T8\par ¦ Strength : external rotation 5/5, internal rotation 5/5, supraspinatus 5/5\par ¦ Stability : no joint instability on provocative testing\par ¦ Tests/Signs : Neer (-), Garcia (-), O’Trey’s Test (-), Crank Test (-) \par o Upper Arm : no tenderness, no swelling, no deformities\par o Muscle Bulk : no atrophy\par o Sensation : sensation intact to light touch\par o Skin : no skin rash or discoloration\par o Vascular Exam : no edema, no cyanosis, radial and ulnar pulses normal \par \par Left Upper Extremity\par o Shoulder :\par ¦ Inspection/Palpation : no tenderness, no swelling, no deformities\par ¦ Range of Motion : ACTIVE FORWARD ELEVATION: Measured at 150 degrees, ACTIVE EXTERNAL ROTATION: Measured at 70 degrees, ACTIVE INTERNAL ROTATION: Measured at T8\par ¦ Strength : external rotation 5/5, internal rotation 5/5, supraspinatus 5/5\par ¦ Stability : no joint instability on provocative testing\par ¦ Tests/Signs : Neer (-), Garcia (-)\par o Upper Arm : no tenderness, no swelling, no deformities\par o Muscle Bulk : no atrophy\par o Sensation : sensation intact to light touch\par o Skin : no skin rash or discoloration\par o Vascular Exam : no edema, no cyanosis, radial and ulnar pulses normal  [de-identified] : o XRays of the bilateral shoulder obtained at [facility] on 4/27/2017, revealed:\par Unremarkable. \par \par o An MRI of the right shoulder obtained at [facility] on 7/13/2018, revealed:\par 1. Moderate supraspinatus tendinosis with low to moderate grad interstitial tear at the footprint. \par 2. Mild tenosynovitis of the long head biceps tendon.\par 3. Nondisplaced tear posterior labrum.\par 4. Moderate acromioclavicular joint osteoarthritis.\par 5. No evidence of paralabral cyst.\par \par

## 2020-01-10 NOTE — DISCUSSION/SUMMARY
[de-identified] : The underlying pathophysiology was reviewed in great detail with the patient as well as the various treatment options, including ice, analgesics, NSAIDs, Physical therapy, steroid injections. \par \par A home exercise sheet was given and discussed with the patient to follow. \par \par The risks and benefits of a right SLAP tear repair were discussed in great detail. I explained that since he is essentially asymptomatic there is no indication for surgery at this time. \par I advised the patient to work on good posture. \par \par FU PRN.

## 2020-01-10 NOTE — HISTORY OF PRESENT ILLNESS
[de-identified] : 54 year old male presents for an evaluation of chronic right shoulder pain. Of note, the patient was diagnosed with bilateral SLAP tears in 2014 and is s/p left SLAP repair in 2014. He reports that surgery helped to improve his upward mobility, though he notes that his left arm has felt "heavy" compared to his right arm. A prior MRI of the right shoulder obtained on 6/11/14 revealed supraspinatus and infraspinatus tendinosis with superimposed focal concealed footprint tear of the central supraspinatus tendon, shallow articular side partial tear of the cranial subscapularis tendon, tear of the superior labrum extending to involve the posterior labrum associated with septated paralabral cyst, as well as posterior glenoid cartilage loss. On 4/27/2017, XRays of the bilateral shoulders were obtained which were unremarkable. On 7/13/2018, an MRI of the right shoulder was obtained which revealed moderate supraspinatus tendinosis with low to moderate grad interstitial tear at the footprint, mild tenosynovitis of the long head biceps tendon, nondisplaced tear posterior labrum, moderate acromioclavicular joint osteoarthritis, with no evidence of paralabral cyst. He presents with the imaging. Currently he rates his pain a 0/10 and describes no pain or weakness about his right shoulder, though he reports stiffness. The patient would like to discuss surgical intervention at this time.

## 2020-01-14 ENCOUNTER — APPOINTMENT (OUTPATIENT)
Dept: ORTHOPEDIC SURGERY | Facility: CLINIC | Age: 55
End: 2020-01-14
Payer: MEDICARE

## 2020-01-14 DIAGNOSIS — G56.02 CARPAL TUNNEL SYNDROME, LEFT UPPER LIMB: ICD-10-CM

## 2020-01-14 PROCEDURE — 99213 OFFICE O/P EST LOW 20 MIN: CPT

## 2020-01-14 NOTE — ADDENDUM
[FreeTextEntry1] : I, Usha Youssef wrote this note acting as a scribe for Dr. Josué Zambrano on Jan 14, 2020.

## 2020-01-14 NOTE — PHYSICAL EXAM
[de-identified] : Patient is WDWN, alert, and in no acute distress. Breathing is unlabored. He is grossly oriented to person, place, and time. \par \par Left Wrist: No tenderness, edema, or deformities. No interosseous or thenar atrophy. Full ROM with decreased sensation along the ulnar nerve distribution. \par Tests/Signs: Negative Tinel's over the medial elbow (Cubital tunnel). \par \par  Test: \par Right Hand 104 lbs ; Left Hand 111 lbs \par Pinch Test: \par Right Hand 22 lbs ; Left Hand 22 lbs  [de-identified] : No imaging done today.

## 2020-01-14 NOTE — END OF VISIT
[FreeTextEntry3] : I, Josué Zambrano MD, ordering physician, have read and attest that all the information, medical decision making and discharge instructions within are true and accurate.

## 2020-01-14 NOTE — DISCUSSION/SUMMARY
[FreeTextEntry1] : The underlying pathophysiology was reviewed with the patient. Treatment options were discussed including; observation, NSAIDS, analgesics, EMG. \par \par The patient presents with decreased sensation extending through the ulnar nerve distribution, however after measuring his  and pinch strength there does not appear to be weakness which is typically associated with cubital tunnel syndrome. I advised that he return to either Dr. Amin or Dr. Mclean for a repeat EMG compared to his previous findings to assess for ulnar nerve neuropathy vs. cervical radiculopathy.

## 2020-01-14 NOTE — CONSULT LETTER
[Please see my note below.] : Please see my note below. [Consult Letter:] : I had the pleasure of evaluating your patient, [unfilled]. [Dear  ___] : Dear  [unfilled], [Consult Closing:] : Thank you very much for allowing me to participate in the care of this patient.  If you have any questions, please do not hesitate to contact me. [Sincerely,] : Sincerely, [DrPark  ___] : Dr. WALL [FreeTextEntry3] : Josué Zambrano MD  [FreeTextEntry2] : DAHLIA GARCÍA

## 2020-03-18 ENCOUNTER — APPOINTMENT (OUTPATIENT)
Dept: ORTHOPEDIC SURGERY | Facility: CLINIC | Age: 55
End: 2020-03-18

## 2020-03-19 ENCOUNTER — APPOINTMENT (OUTPATIENT)
Dept: FAMILY MEDICINE | Facility: CLINIC | Age: 55
End: 2020-03-19

## 2020-07-22 ENCOUNTER — APPOINTMENT (OUTPATIENT)
Dept: ORTHOPEDIC SURGERY | Facility: CLINIC | Age: 55
End: 2020-07-22
Payer: MEDICARE

## 2020-07-22 VITALS
WEIGHT: 152 LBS | BODY MASS INDEX: 20.59 KG/M2 | DIASTOLIC BLOOD PRESSURE: 68 MMHG | TEMPERATURE: 97.3 F | HEIGHT: 72 IN | SYSTOLIC BLOOD PRESSURE: 109 MMHG | HEART RATE: 81 BPM

## 2020-07-22 DIAGNOSIS — Z87.39 PERSONAL HISTORY OF OTHER DISEASES OF THE MUSCULOSKELETAL SYSTEM AND CONNECTIVE TISSUE: ICD-10-CM

## 2020-07-22 PROCEDURE — 99214 OFFICE O/P EST MOD 30 MIN: CPT

## 2020-07-22 NOTE — DISCUSSION/SUMMARY
[FreeTextEntry1] : He has complaints of a "strange" feeling in his left hand at the little and ring fingers.  He denies appreciable numbness or tingling.  There are no focal abnormal findings noted on examination today.  He told me that he recently\par \par I had a discussion regarding today's visit, the diagnosis, and treatment recommendations / options.  I told him that I do not see any concerning findings on examination.  He was instructed on a home strengthening program.  He will follow-up on an as-needed basis.\par \par The patient has agreed to this plan of management and has expressed full understanding.  All questions were fully answered to the patient's satisfaction.\par \par Over 50% of the time spent with the patient was on counseling the patient on the above diagnosis, treatment plan and prognosis.

## 2020-07-22 NOTE — END OF VISIT
[FreeTextEntry3] : This note was written by Katherine Matias on 07/22/2020 acting solely as a scribe for Dr. Rhett Major.\par  \par All medical record entries made by the Scribe were at my, Dr. Rhett Major, direction and personally dictated by me on 07/22/2020. I have personally reviewed the chart and agree that the record accurately reflects my personal performance of the history, physical exam, assessment and plan.

## 2020-07-22 NOTE — PHYSICAL EXAM
[de-identified] : - Constitutional: This is a male in no obvious distress.  \par - Psych: Patient is alert and oriented to person, place and time.  \par - Cardiovascular: Normal pulses throughout the upper extremities.  No significant varicosities are noted in the upper extremities. \par - Respiratory:  Patient exhibits no evidence of shortness of breath or difficulty breathing.\par - Skin: No rashes, lesions, or other abnormalities are noted in the upper extremities.\par \par ---\par  \par Examination of his left hand demonstrates no obvious atrophy.  He has complaints of difficulty with terminal flexion of the little finger, but has full flexion and extension of the digits.  There is normal sensation along the radial, ulnar and median nerve distributions.  There is no obvious intrinsic weakness or thenar weakness.  Provocative signs for carpal and cubital tunnel syndrome are negative.

## 2020-07-22 NOTE — ADDENDUM
[FreeTextEntry1] : I, Katherine Matias, acted solely as a scribe for Dr. Major on this date 07/22/2020.

## 2020-07-22 NOTE — HISTORY OF PRESENT ILLNESS
[Right] : right hand dominant [FreeTextEntry1] : He comes in today for evaluation of left hand weakness and a "strange" feeling, which began a few months ago. He denies any injury. He describes sensations of discomfort and "stuffiness"  in the little and ring fingers of his left hand. He reports that his left hand feels "different" compared to his right hand. He denies any numbness/tingling. \par \par He previously had an EMG of his upper extremities, but does not present with the report.  However, he was told that they were within normal limits.

## 2020-09-09 ENCOUNTER — EMERGENCY (EMERGENCY)
Facility: HOSPITAL | Age: 55
LOS: 1 days | Discharge: HOME | End: 2020-09-09
Attending: EMERGENCY MEDICINE | Admitting: EMERGENCY MEDICINE
Payer: MEDICARE

## 2020-09-09 VITALS
RESPIRATION RATE: 16 BRPM | OXYGEN SATURATION: 100 % | TEMPERATURE: 98 F | SYSTOLIC BLOOD PRESSURE: 114 MMHG | DIASTOLIC BLOOD PRESSURE: 78 MMHG | WEIGHT: 160.06 LBS | HEART RATE: 54 BPM

## 2020-09-09 DIAGNOSIS — M77.32 CALCANEAL SPUR, LEFT FOOT: Chronic | ICD-10-CM

## 2020-09-09 PROCEDURE — 99282 EMERGENCY DEPT VISIT SF MDM: CPT

## 2020-09-09 RX ORDER — IBUPROFEN 200 MG
400 TABLET ORAL ONCE
Refills: 0 | Status: COMPLETED | OUTPATIENT
Start: 2020-09-09 | End: 2020-09-09

## 2020-09-09 RX ORDER — ASPIRIN/CALCIUM CARB/MAGNESIUM 324 MG
1 TABLET ORAL
Qty: 0 | Refills: 0 | DISCHARGE

## 2020-09-09 RX ADMIN — Medication 400 MILLIGRAM(S): at 10:02

## 2020-09-09 NOTE — ED PROVIDER NOTE - NS ED ATTENDING STATEMENT MOD
[id number];  Therapeutic Exercise:  30       mins  69872;     Neuromuscular Mely:        mins  73602;    Therapeutic Activity:          mins  43166;     Gait Training:           mins  98014;     Ultrasound:          mins  46348;    Electrical  Stimulation:         mins  09453 ( );    Untimed:  Electrical Stimulation:         mins  27954 ( );  Mechanical Traction:         mins  03778;     [facility].           Attending Only

## 2020-09-09 NOTE — ED PROVIDER NOTE - CLINICAL SUMMARY MEDICAL DECISION MAKING FREE TEXT BOX
Pt with right elbow pain s/p trimming edges, likely musculoskeletal, fx or septic joint unlikely. Will tx with NSAIDs and ace wrap and pt has ortho to f/u with

## 2020-09-09 NOTE — ED PROVIDER NOTE - MUSCULOSKELETAL, MLM
+small insect bite to right lateral elbow with minimal erythema, no warmth, no crepitus, normal passive and active ROM right elbow with no bony ttp, +radius pulse

## 2020-09-09 NOTE — ED PROVIDER NOTE - NSFOLLOWUPINSTRUCTIONS_ED_ALL_ED_FT
1. Ace wrap for comfort  2. Motrin 400mg every 6 hours on a full stomach as needed for pain  3. Follow up with your ortho in 1-2 days  4. Return to the ED for worsening pain, fevers, chills, swelling or any concerns  ***  Joint Pain  Joint pain (arthralgia) may be caused by many things. Joint pain is likely to go away when you follow instructions from your health care provider for relieving pain at home. However, joint pain can also be caused by conditions that require more treatment. Common causes of joint pain include:  Bruising in the area of the joint.Injury caused by repeating certain movements too many times (overuse injury).Age-related joint wear and tear (osteoarthritis).Buildup of uric acid crystals in the joint (gout).Inflammation of the joint (rheumatic disease).Various other forms of arthritis.Infections of the joint (septic arthritis) or of the bone (osteomyelitis).Your health care provider may recommend that you take pain medicine or wear a supportive device like an elastic bandage, sling, or splint. If your joint pain continues, you may need lab or imaging tests to diagnose the cause of your joint pain.  Follow these instructions at home:  Managing pain, stiffness, and swelling        If directed, put ice on the painful area. Icing can help to relieve joint pain and swelling.  Put ice in a plastic bag.Place a towel between your skin and the bag.Leave the ice on for 20 minutes, 2–3 times a day.If directed, apply heat to the painful area as often as told by your health care provider. Heat can reduce the stiffness of your muscles and joints. Use the heat source that your health care provider recommends, such as a moist heat pack or a heating pad.  Place a towel between your skin and the heat source.Leave the heat on for 20–30 minutes.Remove the heat if your skin turns bright red. This is especially important if you are unable to feel pain, heat, or cold. You may have a greater risk of getting burned.Move your fingers or toes below the painful joint often. You can avoid stiffness and lessen swelling by doing this.If possible, raise (elevate) the painful joint above the level of your heart while you are sitting or lying down. To do this, try putting a few pillows under the painful joint.Activity     Rest the painful joint for as long as directed. Do not do anything that causes or worsens pain.Begin exercising or stretching the affected area, as told by your health care provider. Ask your health care provider what types of exercise are safe for you.If you have an elastic bandage, sling, or splint:     Wear the supportive device as told by your health care provider. Remove it only as told by your health care provider.Loosen the device if your fingers or toes below the joint tingle, become numb, or turn cold and blue.Keep the device clean. Ask your health care provider if you should remove the device before bathing. You may need to cover it with a watertight covering when you take a bath or a shower.General instructions     Take over-the-counter and prescription medicines only as told by your health care provider.Do not use any products that contain nicotine or tobacco, such as cigarettes and e-cigarettes. If you need help quitting, ask your health care provider.Keep all follow-up visits as told by your health care provider. This is important.Contact a health care provider if:  You have pain that gets worse and does not get better with medicine.Your joint pain does not improve within 3 days.You have increased bruising or swelling.You have a fever.You lose 10 lb (4.5 kg) or more without trying.Get help right away if:  You cannot move the joint.Your fingers or toes tingle, become numb, or turn cold and blue.You have a fever along with a joint that is red, warm, and swollen.Summary  Joint pain (arthralgia) may be caused by many things.Your health care provider may recommend that you take pain medicine or wear a supportive device like an elastic bandage, sling, or splint.If your joint pain continues, you may need tests to diagnose the cause of your joint pain.Take over-the-counter and prescription medicines only as told by your health care provider.This information is not intended to replace advice given to you by your health care provider. Make sure you discuss any questions you have with your health care provider.

## 2020-09-09 NOTE — ED ADULT NURSE NOTE - OBJECTIVE STATEMENT
Patient presents to ED alert and oriented with complaints of right elbow pain from pruning in garden yesterday. Took advil yesterday but pain persists

## 2020-09-09 NOTE — ED PROVIDER NOTE - PATIENT PORTAL LINK FT
You can access the FollowMyHealth Patient Portal offered by [facility] by registering at the following website: [email or web address]. By joining [facility]’s FollowMyHealth portal, you will also be able to view your health information using other applications (apps) compatible with our system.

## 2020-09-09 NOTE — ED PROVIDER NOTE - OBJECTIVE STATEMENT
55M h/o acute on chronic right elbow pain p/w right elbow pain after trimming hedges yesterday. Pt is right handed. No direct trauma. Took Advil yesterday with some relief. Has 55M h/o acute on chronic right elbow pain p/w right elbow pain after trimming hedges yesterday. Pt is right handed. No direct trauma. Took Advil yesterday with some relief. Has an orthopedist he has seen before who did an Xray last year which was non diagnostic. Also noticed mild erythema to right elbow after a ?insect bite, non pruritic, non painful. no fevers or chills.

## 2020-09-14 ENCOUNTER — APPOINTMENT (OUTPATIENT)
Dept: FAMILY MEDICINE | Facility: CLINIC | Age: 55
End: 2020-09-14
Payer: MEDICARE

## 2020-09-14 VITALS
SYSTOLIC BLOOD PRESSURE: 90 MMHG | BODY MASS INDEX: 21.96 KG/M2 | HEIGHT: 72 IN | TEMPERATURE: 99.3 F | HEART RATE: 74 BPM | OXYGEN SATURATION: 98 % | DIASTOLIC BLOOD PRESSURE: 60 MMHG | WEIGHT: 162.13 LBS | RESPIRATION RATE: 12 BRPM

## 2020-09-14 DIAGNOSIS — M25.529 PAIN IN UNSPECIFIED ELBOW: ICD-10-CM

## 2020-09-14 DIAGNOSIS — M25.511 PAIN IN RIGHT SHOULDER: ICD-10-CM

## 2020-09-14 PROCEDURE — 99213 OFFICE O/P EST LOW 20 MIN: CPT

## 2020-09-14 NOTE — HISTORY OF PRESENT ILLNESS
[FreeTextEntry1] : R shoulder, R elbow  [FreeTextEntry8] : 1/6/2020: Patient presents to office because he wants a SLAP surgery performed for his right glenoid tear that he experienced while lifting weights five years ago. He notes he tore both labrums and had surgery by Dr. Guerrero on his left. He reports having MRIs performed years prior which showed the tear. He denies any pain in right shoulder but notes significant clicking and stiffness  at times. \par \par  [de-identified] : Pt comes in today with R shoulder and R elbow pain, he went to ER a couple days ago. ER told him he needed MRI of the elbow, but at time decided against it. They advised him to follow up with primary care. Pt would like ortho shoulder referral. Pt says he has had pain for at least a year, he declines any ADL or ROM a difficulties. He denies there being any specific injury or trauma to the areas. He says that they did not bother him until recently. He bought a brace for the elbow, and was told to take motrin. He reports some relief when taking advil for pain. Pt denies any numbness, tingling, fevers, chills, CP, palpitations, SOB, or cough. Reports most pain from reaching for things above his head.

## 2020-09-14 NOTE — PHYSICAL EXAM
[Normal] : normal gait, coordination grossly intact, no focal deficits and deep tendon reflexes were 2+ and symmetric [de-identified] : Full range of motion of right shoulder. Negative empty can or painful arc

## 2020-09-14 NOTE — PLAN
[FreeTextEntry1] : 1/6/2020: Orthopedic referral given. Encouraged patient to bring imaging with him

## 2020-09-14 NOTE — REVIEW OF SYSTEMS
[Joint Stiffness] : joint stiffness [Joint Pain] : joint pain [Joint Swelling] : joint swelling [Negative] : Gastrointestinal

## 2020-09-16 ENCOUNTER — APPOINTMENT (OUTPATIENT)
Dept: ORTHOPEDIC SURGERY | Facility: CLINIC | Age: 55
End: 2020-09-16
Payer: MEDICARE

## 2020-09-16 DIAGNOSIS — M77.11 LATERAL EPICONDYLITIS, RIGHT ELBOW: ICD-10-CM

## 2020-09-16 DIAGNOSIS — M25.611 STIFFNESS OF RIGHT SHOULDER, NOT ELSEWHERE CLASSIFIED: ICD-10-CM

## 2020-09-16 PROCEDURE — 73070 X-RAY EXAM OF ELBOW: CPT | Mod: RT

## 2020-09-16 PROCEDURE — 73030 X-RAY EXAM OF SHOULDER: CPT | Mod: RT

## 2020-09-16 PROCEDURE — 99214 OFFICE O/P EST MOD 30 MIN: CPT

## 2020-09-24 ENCOUNTER — RESULT REVIEW (OUTPATIENT)
Age: 55
End: 2020-09-24

## 2020-09-24 ENCOUNTER — APPOINTMENT (OUTPATIENT)
Dept: RADIOLOGY | Facility: CLINIC | Age: 55
End: 2020-09-24
Payer: MEDICARE

## 2020-09-24 ENCOUNTER — OUTPATIENT (OUTPATIENT)
Dept: OUTPATIENT SERVICES | Facility: HOSPITAL | Age: 55
LOS: 1 days | End: 2020-09-24
Payer: MEDICARE

## 2020-09-24 ENCOUNTER — APPOINTMENT (OUTPATIENT)
Dept: MRI IMAGING | Facility: CLINIC | Age: 55
End: 2020-09-24
Payer: MEDICARE

## 2020-09-24 DIAGNOSIS — S43.411A SPRAIN OF RIGHT CORACOHUMERAL (LIGAMENT), INITIAL ENCOUNTER: ICD-10-CM

## 2020-09-24 DIAGNOSIS — M19.011 PRIMARY OSTEOARTHRITIS, RIGHT SHOULDER: ICD-10-CM

## 2020-09-24 DIAGNOSIS — M77.32 CALCANEAL SPUR, LEFT FOOT: Chronic | ICD-10-CM

## 2020-09-24 PROCEDURE — 73080 X-RAY EXAM OF ELBOW: CPT | Mod: 26,RT

## 2020-09-24 PROCEDURE — 73030 X-RAY EXAM OF SHOULDER: CPT | Mod: 26,RT

## 2020-09-24 PROCEDURE — 73221 MRI JOINT UPR EXTREM W/O DYE: CPT | Mod: 26,RT

## 2020-09-24 PROCEDURE — 73221 MRI JOINT UPR EXTREM W/O DYE: CPT

## 2020-09-24 PROCEDURE — 73080 X-RAY EXAM OF ELBOW: CPT

## 2020-09-24 PROCEDURE — 73030 X-RAY EXAM OF SHOULDER: CPT

## 2020-10-01 ENCOUNTER — APPOINTMENT (OUTPATIENT)
Dept: ORTHOPEDIC SURGERY | Facility: CLINIC | Age: 55
End: 2020-10-01
Payer: MEDICARE

## 2020-10-01 DIAGNOSIS — S43.431D SUPERIOR GLENOID LABRUM LESION OF RIGHT SHOULDER, SUBSEQUENT ENCOUNTER: ICD-10-CM

## 2020-10-01 PROCEDURE — 99214 OFFICE O/P EST MOD 30 MIN: CPT

## 2020-10-06 LAB
ALBUMIN SERPL ELPH-MCNC: 4.7 G/DL
ALP BLD-CCNC: 84 U/L
ALT SERPL-CCNC: 21 U/L
ANION GAP SERPL CALC-SCNC: 13 MMOL/L
APTT BLD: 35.3 SEC
AST SERPL-CCNC: 30 U/L
BASOPHILS # BLD AUTO: 0.06 K/UL
BASOPHILS NFR BLD AUTO: 1 %
BILIRUB SERPL-MCNC: 0.4 MG/DL
BUN SERPL-MCNC: 22 MG/DL
CALCIUM SERPL-MCNC: 9.4 MG/DL
CHLORIDE SERPL-SCNC: 101 MMOL/L
CO2 SERPL-SCNC: 25 MMOL/L
CREAT SERPL-MCNC: 1.34 MG/DL
EOSINOPHIL # BLD AUTO: 0.17 K/UL
EOSINOPHIL NFR BLD AUTO: 2.8 %
GLUCOSE SERPL-MCNC: 87 MG/DL
HCT VFR BLD CALC: 38 %
HGB BLD-MCNC: 13.4 G/DL
IMM GRANULOCYTES NFR BLD AUTO: 0.2 %
INR PPP: 1.06 RATIO
LYMPHOCYTES # BLD AUTO: 1.68 K/UL
LYMPHOCYTES NFR BLD AUTO: 27.2 %
MAN DIFF?: NORMAL
MCHC RBC-ENTMCNC: 30.5 PG
MCHC RBC-ENTMCNC: 35.3 GM/DL
MCV RBC AUTO: 86.4 FL
MONOCYTES # BLD AUTO: 0.43 K/UL
MONOCYTES NFR BLD AUTO: 7 %
NEUTROPHILS # BLD AUTO: 3.83 K/UL
NEUTROPHILS NFR BLD AUTO: 61.8 %
PLATELET # BLD AUTO: 142 K/UL
POTASSIUM SERPL-SCNC: 4.6 MMOL/L
PROT SERPL-MCNC: 6.7 G/DL
PT BLD: 12.5 SEC
RBC # BLD: 4.4 M/UL
RBC # FLD: 12.4 %
SODIUM SERPL-SCNC: 139 MMOL/L
WBC # FLD AUTO: 6.18 K/UL

## 2020-10-17 ENCOUNTER — APPOINTMENT (OUTPATIENT)
Dept: RADIOLOGY | Facility: CLINIC | Age: 55
End: 2020-10-17
Payer: MEDICARE

## 2020-10-17 ENCOUNTER — OUTPATIENT (OUTPATIENT)
Dept: OUTPATIENT SERVICES | Facility: HOSPITAL | Age: 55
LOS: 1 days | End: 2020-10-17
Payer: MEDICARE

## 2020-10-17 ENCOUNTER — OUTPATIENT (OUTPATIENT)
Dept: OUTPATIENT SERVICES | Facility: HOSPITAL | Age: 55
LOS: 1 days | End: 2020-10-17

## 2020-10-17 DIAGNOSIS — M77.32 CALCANEAL SPUR, LEFT FOOT: Chronic | ICD-10-CM

## 2020-10-17 DIAGNOSIS — Z00.8 ENCOUNTER FOR OTHER GENERAL EXAMINATION: ICD-10-CM

## 2020-10-17 PROCEDURE — 73130 X-RAY EXAM OF HAND: CPT

## 2020-10-17 PROCEDURE — 73130 X-RAY EXAM OF HAND: CPT | Mod: 26,LT

## 2020-10-22 ENCOUNTER — APPOINTMENT (OUTPATIENT)
Dept: FAMILY MEDICINE | Facility: CLINIC | Age: 55
End: 2020-10-22
Payer: MEDICARE

## 2020-10-22 ENCOUNTER — NON-APPOINTMENT (OUTPATIENT)
Age: 55
End: 2020-10-22

## 2020-10-22 VITALS
BODY MASS INDEX: 22.09 KG/M2 | HEART RATE: 67 BPM | OXYGEN SATURATION: 97 % | SYSTOLIC BLOOD PRESSURE: 104 MMHG | TEMPERATURE: 98.4 F | WEIGHT: 163.13 LBS | RESPIRATION RATE: 12 BRPM | DIASTOLIC BLOOD PRESSURE: 62 MMHG | HEIGHT: 72 IN

## 2020-10-22 PROCEDURE — 99214 OFFICE O/P EST MOD 30 MIN: CPT | Mod: 25

## 2020-10-22 PROCEDURE — 93000 ELECTROCARDIOGRAM COMPLETE: CPT

## 2020-10-22 NOTE — RESULTS/DATA
[] : results reviewed [de-identified] : platelets slightly low, no contraindications to surgery [de-identified] : wnl [de-identified] : creat sightly elevated, no contraindications to surgery [de-identified] : sinus no acute changes [de-identified] : will follow cbc, cmp after surgery

## 2020-10-22 NOTE — HISTORY OF PRESENT ILLNESS
[No Pertinent Cardiac History] : no history of aortic stenosis, atrial fibrillation, coronary artery disease, recent myocardial infarction, or implantable device/pacemaker [(Patient denies any chest pain, claudication, dyspnea on exertion, orthopnea, palpitations or syncope)] : Patient denies any chest pain, claudication, dyspnea on exertion, orthopnea, palpitations or syncope [No Pertinent Pulmonary History] : no history of asthma, COPD, sleep apnea, or smoking [Other: _____] : [unfilled] [Aortic Stenosis] : no aortic stenosis [Atrial Fibrillation] : no atrial fibrillation [Coronary Artery Disease] : no coronary artery disease [Recent Myocardial Infarction] : no recent myocardial infarction [Implantable Device/Pacemaker] : no implantable device/pacemaker [Asthma] : no asthma [COPD] : no COPD [Sleep Apnea] : no sleep apnea [Smoker] : not a smoker [Family Member] : no family member with adverse anesthesia reaction/sudden death [Self] : no previous adverse anesthesia reaction [Chronic Anticoagulation] : no chronic anticoagulation [Chronic Kidney Disease] : no chronic kidney disease [Diabetes] : no diabetes [Moderate (4-6 METs)] : Moderate (4-6 METs) [FreeTextEntry1] : Right shoulder surgery [FreeTextEntry2] : 10/27/2020 [FreeTextEntry3] : Dr. Coelho ([facility]) [FreeTextEntry4] : right shoulder degeration /tears upcoming surgery \par f\par  [FreeTextEntry7] : EKG 10/22/20 sinus no actue st changes

## 2020-10-23 ENCOUNTER — OUTPATIENT (OUTPATIENT)
Dept: OUTPATIENT SERVICES | Facility: HOSPITAL | Age: 55
LOS: 1 days | End: 2020-10-23
Payer: MEDICARE

## 2020-10-23 VITALS
OXYGEN SATURATION: 99 % | TEMPERATURE: 98 F | RESPIRATION RATE: 16 BRPM | DIASTOLIC BLOOD PRESSURE: 66 MMHG | WEIGHT: 160.06 LBS | SYSTOLIC BLOOD PRESSURE: 104 MMHG | HEART RATE: 67 BPM | HEIGHT: 72 IN

## 2020-10-23 DIAGNOSIS — S02.609A FRACTURE OF MANDIBLE, UNSPECIFIED, INITIAL ENCOUNTER FOR CLOSED FRACTURE: Chronic | ICD-10-CM

## 2020-10-23 DIAGNOSIS — Z01.818 ENCOUNTER FOR OTHER PREPROCEDURAL EXAMINATION: ICD-10-CM

## 2020-10-23 DIAGNOSIS — M75.101 UNSPECIFIED ROTATOR CUFF TEAR OR RUPTURE OF RIGHT SHOULDER, NOT SPECIFIED AS TRAUMATIC: ICD-10-CM

## 2020-10-23 DIAGNOSIS — Z98.890 OTHER SPECIFIED POSTPROCEDURAL STATES: Chronic | ICD-10-CM

## 2020-10-23 DIAGNOSIS — Z96.0 PRESENCE OF UROGENITAL IMPLANTS: Chronic | ICD-10-CM

## 2020-10-23 DIAGNOSIS — M77.32 CALCANEAL SPUR, LEFT FOOT: Chronic | ICD-10-CM

## 2020-10-23 RX ORDER — MULTIVIT-MIN/FERROUS GLUCONATE 9 MG/15 ML
1 LIQUID (ML) ORAL
Qty: 0 | Refills: 0 | DISCHARGE

## 2020-10-23 NOTE — H&P PST ADULT - ASSESSMENT
56 y/o 54 y/o male with right shoulder labral /rotator cuff tear     scheduled for right shoulder arthroscopy   will obtain medical clearance   Labs and EKG done by PCP  COVID testing 10/25/20 at 8 am   Pre op instructions on medications and wash

## 2020-10-23 NOTE — H&P PST ADULT - HISTORY OF PRESENT ILLNESS
55 year old male with h/o Depression dx 1985, s/p ECT  x 2, c/o right  shoulder pain , limited ROM since 11/2013 secondary to weight lifting, . Reports intermittent pain and increased pain when raising arm .MRI done finding of " labral tear/ rotator cuff tear . Patient scheduled for right shoulder arthroscopy , possible rotator cuff repair on 10/27/20

## 2020-10-23 NOTE — H&P PST ADULT - NSICDXPASTSURGICALHX_GEN_ALL_CORE_FT
PAST SURGICAL HISTORY:  Ankle bone spur, left removal 2/1988    Broken jaw surgery 1994    H/O arthroscopy of shoulder     History of arthroscopy of left shoulder     S/P cystoscopy with ureteral stent placement 2014 h/o kidney stones stent removed 2015     PAST SURGICAL HISTORY:  Ankle bone spur, left removal 2/1988    Broken jaw surgery 1994    History of arthroscopy of left shoulder 2014    S/P cystoscopy with ureteral stent placement 2014 h/o kidney stones stent removed 2015

## 2020-10-23 NOTE — H&P PST ADULT - NSICDXFAMILYHX_GEN_ALL_CORE_FT
FAMILY HISTORY:  Father  Still living? Yes, Estimated age: Age Unknown  Family history of pancreatic cancer, Age at diagnosis: Age Unknown    Sibling  Still living? Yes, Estimated age: Age Unknown  Family history of depression, Age at diagnosis: Age Unknown

## 2020-10-23 NOTE — H&P PST ADULT - NSANTHOSAYNRD_GEN_A_CORE
No. SELMA screening performed.  STOP BANG Legend: 0-2 = LOW Risk; 3-4 = INTERMEDIATE Risk; 5-8 = HIGH Risk

## 2020-10-23 NOTE — H&P PST ADULT - MUSCULOSKELETAL
details… detailed exam decreased ROM due to pain/right shoulder, right foot, slight weakness right foot/no calf tenderness

## 2020-10-23 NOTE — H&P PST ADULT - NSICDXPASTMEDICALHX_GEN_ALL_CORE_FT
PAST MEDICAL HISTORY:  Depression s/p ECT x 2    Insomnia     Kidney stones      PAST MEDICAL HISTORY:  Depression s/p ECT x 2    Insomnia     Kidney stones     Venous insufficiency of both lower extremities

## 2020-10-25 ENCOUNTER — OUTPATIENT (OUTPATIENT)
Dept: OUTPATIENT SERVICES | Facility: HOSPITAL | Age: 55
LOS: 1 days | End: 2020-10-25
Payer: MEDICARE

## 2020-10-25 DIAGNOSIS — Z98.890 OTHER SPECIFIED POSTPROCEDURAL STATES: Chronic | ICD-10-CM

## 2020-10-25 DIAGNOSIS — M77.32 CALCANEAL SPUR, LEFT FOOT: Chronic | ICD-10-CM

## 2020-10-25 DIAGNOSIS — Z96.0 PRESENCE OF UROGENITAL IMPLANTS: Chronic | ICD-10-CM

## 2020-10-25 DIAGNOSIS — Z11.59 ENCOUNTER FOR SCREENING FOR OTHER VIRAL DISEASES: ICD-10-CM

## 2020-10-25 DIAGNOSIS — S02.609A FRACTURE OF MANDIBLE, UNSPECIFIED, INITIAL ENCOUNTER FOR CLOSED FRACTURE: Chronic | ICD-10-CM

## 2020-10-25 LAB — SARS-COV-2 RNA SPEC QL NAA+PROBE: SIGNIFICANT CHANGE UP

## 2020-10-25 PROCEDURE — U0003: CPT

## 2020-10-26 ENCOUNTER — TRANSCRIPTION ENCOUNTER (OUTPATIENT)
Age: 55
End: 2020-10-26

## 2020-10-26 PROCEDURE — G0463: CPT

## 2020-10-26 NOTE — ASU PATIENT PROFILE, ADULT - PMH
Depression  s/p ECT x 2  Insomnia    Kidney stones    Venous insufficiency of both lower extremities

## 2020-10-26 NOTE — ASU PATIENT PROFILE, ADULT - PSH
Ankle bone spur, left  removal 2/1988  Broken jaw  surgery 1994  History of arthroscopy of left shoulder  2014  S/P cystoscopy with ureteral stent placement  2014 h/o kidney stones stent removed 2015

## 2020-10-26 NOTE — ASU PATIENT PROFILE, ADULT - PRO INTERPRETER NEED 2
Patient calling requesting to speak to a nurse regarding his Lovenox shots. Call connected to [facility]   Routed to [facility] pool       Armenian English

## 2020-10-27 ENCOUNTER — RESULT REVIEW (OUTPATIENT)
Age: 55
End: 2020-10-27

## 2020-10-27 ENCOUNTER — OUTPATIENT (OUTPATIENT)
Dept: OUTPATIENT SERVICES | Facility: HOSPITAL | Age: 55
LOS: 1 days | End: 2020-10-27
Payer: MEDICARE

## 2020-10-27 ENCOUNTER — APPOINTMENT (OUTPATIENT)
Dept: ORTHOPEDIC SURGERY | Facility: HOSPITAL | Age: 55
End: 2020-10-27

## 2020-10-27 VITALS
WEIGHT: 159.17 LBS | SYSTOLIC BLOOD PRESSURE: 104 MMHG | HEIGHT: 72 IN | OXYGEN SATURATION: 100 % | DIASTOLIC BLOOD PRESSURE: 66 MMHG | TEMPERATURE: 99 F | HEART RATE: 67 BPM | RESPIRATION RATE: 16 BRPM

## 2020-10-27 VITALS
RESPIRATION RATE: 18 BRPM | OXYGEN SATURATION: 98 % | SYSTOLIC BLOOD PRESSURE: 97 MMHG | DIASTOLIC BLOOD PRESSURE: 53 MMHG | HEART RATE: 55 BPM

## 2020-10-27 DIAGNOSIS — Z96.0 PRESENCE OF UROGENITAL IMPLANTS: Chronic | ICD-10-CM

## 2020-10-27 DIAGNOSIS — M75.101 UNSPECIFIED ROTATOR CUFF TEAR OR RUPTURE OF RIGHT SHOULDER, NOT SPECIFIED AS TRAUMATIC: ICD-10-CM

## 2020-10-27 DIAGNOSIS — S02.609A FRACTURE OF MANDIBLE, UNSPECIFIED, INITIAL ENCOUNTER FOR CLOSED FRACTURE: Chronic | ICD-10-CM

## 2020-10-27 DIAGNOSIS — M77.32 CALCANEAL SPUR, LEFT FOOT: Chronic | ICD-10-CM

## 2020-10-27 DIAGNOSIS — Z98.890 OTHER SPECIFIED POSTPROCEDURAL STATES: Chronic | ICD-10-CM

## 2020-10-27 PROBLEM — I87.2 VENOUS INSUFFICIENCY (CHRONIC) (PERIPHERAL): Chronic | Status: ACTIVE | Noted: 2020-10-23

## 2020-10-27 PROCEDURE — 29823 SHO ARTHRS SRG XTNSV DBRDMT: CPT | Mod: RT

## 2020-10-27 PROCEDURE — 88304 TISSUE EXAM BY PATHOLOGIST: CPT

## 2020-10-27 PROCEDURE — 29826 SHO ARTHRS SRG DECOMPRESSION: CPT | Mod: RT

## 2020-10-27 PROCEDURE — 88304 TISSUE EXAM BY PATHOLOGIST: CPT | Mod: 26

## 2020-10-27 RX ORDER — ONDANSETRON 8 MG/1
4 TABLET, FILM COATED ORAL ONCE
Refills: 0 | Status: DISCONTINUED | OUTPATIENT
Start: 2020-10-27 | End: 2020-10-27

## 2020-10-27 RX ORDER — SODIUM CHLORIDE 9 MG/ML
1000 INJECTION, SOLUTION INTRAVENOUS
Refills: 0 | Status: DISCONTINUED | OUTPATIENT
Start: 2020-10-27 | End: 2020-10-27

## 2020-10-27 RX ORDER — CHLORHEXIDINE GLUCONATE 213 G/1000ML
1 SOLUTION TOPICAL ONCE
Refills: 0 | Status: COMPLETED | OUTPATIENT
Start: 2020-10-27 | End: 2020-10-27

## 2020-10-27 RX ORDER — HYDROMORPHONE HYDROCHLORIDE 2 MG/ML
0.5 INJECTION INTRAMUSCULAR; INTRAVENOUS; SUBCUTANEOUS
Refills: 0 | Status: DISCONTINUED | OUTPATIENT
Start: 2020-10-27 | End: 2020-10-27

## 2020-10-27 RX ORDER — APREPITANT 80 MG/1
40 CAPSULE ORAL ONCE
Refills: 0 | Status: COMPLETED | OUTPATIENT
Start: 2020-10-27 | End: 2020-10-27

## 2020-10-27 RX ORDER — CEFAZOLIN SODIUM 1 G
2000 VIAL (EA) INJECTION ONCE
Refills: 0 | Status: COMPLETED | OUTPATIENT
Start: 2020-10-27 | End: 2020-10-27

## 2020-10-27 RX ADMIN — CHLORHEXIDINE GLUCONATE 1 APPLICATION(S): 213 SOLUTION TOPICAL at 09:20

## 2020-10-27 RX ADMIN — APREPITANT 40 MILLIGRAM(S): 80 CAPSULE ORAL at 09:10

## 2020-10-27 RX ADMIN — SODIUM CHLORIDE 75 MILLILITER(S): 9 INJECTION, SOLUTION INTRAVENOUS at 11:54

## 2020-10-27 NOTE — ASU DISCHARGE PLAN (ADULT/PEDIATRIC) - CARE PROVIDER_API CALL
Wan Coelho)  Orthopaedic Surgery  [address]  [place] [address]  Phone: [phone]  Fax: [phone]  Follow Up Time:

## 2020-10-27 NOTE — ASU DISCHARGE PLAN (ADULT/PEDIATRIC) - BATHING
cover with plastic to keep dry/Shower only Do not submerge in water/cover with plastic to keep dry/Shower only

## 2020-10-27 NOTE — BRIEF OPERATIVE NOTE - NSICDXBRIEFPOSTOP_GEN_ALL_CORE_FT
POST-OP DIAGNOSIS:  Impingement syndrome of shoulder 27-Oct-2020 11:37:42 right Angeles Cespedes  Impingement syndrome of shoulder 27-Oct-2020 11:37:42 right Angeles Cespedes  Shoulder instability, right 27-Oct-2020 11:37:25  Angeles Cespedes

## 2020-10-27 NOTE — BRIEF OPERATIVE NOTE - NSICDXBRIEFPROCEDURE_GEN_ALL_CORE_FT
PROCEDURES:  Right shoulder arthroscopy 27-Oct-2020 11:35:38 debridemnent, subacromial decompression Angeles Cespedes

## 2020-10-27 NOTE — BRIEF OPERATIVE NOTE - NSICDXBRIEFPREOP_GEN_ALL_CORE_FT
PRE-OP DIAGNOSIS:  Impingement syndrome of shoulder 27-Oct-2020 11:37:00  Angeles Cespedes  Impingement syndrome of shoulder 27-Oct-2020 11:37:00  Angeles Cespedes  Shoulder instability, right 27-Oct-2020 11:36:55  Angeles Cespedes

## 2020-10-27 NOTE — ASU PREOP CHECKLIST - SELECT TESTS ORDERED
Results in MD note/BMP/PT/PTT/CBC Render Post-Care Instructions In Note?: no Consent: Informed consent was obtained. Duration Of Freeze Thaw-Cycle (Seconds): 0 Detail Level: Simple Post-care instructions were provided and reviewed.

## 2020-11-04 ENCOUNTER — APPOINTMENT (OUTPATIENT)
Dept: ORTHOPEDIC SURGERY | Facility: CLINIC | Age: 55
End: 2020-11-04
Payer: MEDICARE

## 2020-11-04 DIAGNOSIS — M19.029 PRIMARY OSTEOARTHRITIS, UNSPECIFIED ELBOW: ICD-10-CM

## 2020-11-04 PROCEDURE — 99024 POSTOP FOLLOW-UP VISIT: CPT

## 2020-11-04 PROCEDURE — 73030 X-RAY EXAM OF SHOULDER: CPT | Mod: RT

## 2020-11-09 ENCOUNTER — APPOINTMENT (OUTPATIENT)
Dept: ORTHOPEDIC SURGERY | Facility: CLINIC | Age: 55
End: 2020-11-09

## 2020-11-10 ENCOUNTER — APPOINTMENT (OUTPATIENT)
Dept: ORTHOPEDIC SURGERY | Facility: CLINIC | Age: 55
End: 2020-11-10
Payer: MEDICARE

## 2020-11-10 DIAGNOSIS — M65.30 TRIGGER FINGER, UNSPECIFIED FINGER: ICD-10-CM

## 2020-11-10 PROCEDURE — 99213 OFFICE O/P EST LOW 20 MIN: CPT | Mod: 24

## 2020-11-10 NOTE — ADDENDUM
[FreeTextEntry1] : I, Toyin Demarco wrote this note acting as a scribe for Dr. Josué Zambrano on Nov 10, 2020.

## 2020-11-10 NOTE — DISCUSSION/SUMMARY
[FreeTextEntry1] : The underlying pathophysiology was reviewed with the patient. Treatment options were discussed including; nonsurgical and surgical intervention. \par \par The patient wishes to proceed with mass removal on the left index finger at this time. The risks and benefits were reviewed with the patient. All of his questions were answered. He will meet with our surgical scheduler.

## 2020-11-10 NOTE — HISTORY OF PRESENT ILLNESS
[FreeTextEntry1] : Patient is a 55 year old male who presents with a mass on the left index finger. He states that the size of the mass has not changed. He states that the mass is painful when he makes a fist. He states that he was pricked by a thorn of a tree and noticed te mass in October 2020. Patient states that he would like to have the mass removed.

## 2020-11-17 ENCOUNTER — APPOINTMENT (OUTPATIENT)
Dept: FAMILY MEDICINE | Facility: CLINIC | Age: 55
End: 2020-11-17
Payer: MEDICARE

## 2020-11-17 VITALS
BODY MASS INDEX: 21.95 KG/M2 | DIASTOLIC BLOOD PRESSURE: 62 MMHG | WEIGHT: 162.06 LBS | HEIGHT: 72 IN | OXYGEN SATURATION: 96 % | RESPIRATION RATE: 12 BRPM | HEART RATE: 83 BPM | TEMPERATURE: 97.7 F | SYSTOLIC BLOOD PRESSURE: 90 MMHG

## 2020-11-17 DIAGNOSIS — R22.31 LOCALIZED SWELLING, MASS AND LUMP, RIGHT UPPER LIMB: ICD-10-CM

## 2020-11-17 DIAGNOSIS — R29.898 OTHER SYMPTOMS AND SIGNS INVOLVING THE MUSCULOSKELETAL SYSTEM: ICD-10-CM

## 2020-11-17 PROCEDURE — 99214 OFFICE O/P EST MOD 30 MIN: CPT

## 2020-11-17 NOTE — PHYSICAL EXAM
[No Edema] : there was no peripheral edema [Normal] : soft, non-tender, non-distended, no masses palpated, no HSM and normal bowel sounds [No Rash] : no rash [No Focal Deficits] : no focal deficits

## 2020-11-19 ENCOUNTER — OUTPATIENT (OUTPATIENT)
Dept: OUTPATIENT SERVICES | Facility: HOSPITAL | Age: 55
LOS: 1 days | End: 2020-11-19
Payer: MEDICARE

## 2020-11-19 VITALS
DIASTOLIC BLOOD PRESSURE: 63 MMHG | TEMPERATURE: 97 F | HEIGHT: 72 IN | WEIGHT: 159.17 LBS | SYSTOLIC BLOOD PRESSURE: 96 MMHG | OXYGEN SATURATION: 98 % | RESPIRATION RATE: 14 BRPM | HEART RATE: 77 BPM

## 2020-11-19 DIAGNOSIS — Z96.0 PRESENCE OF UROGENITAL IMPLANTS: Chronic | ICD-10-CM

## 2020-11-19 DIAGNOSIS — S02.609A FRACTURE OF MANDIBLE, UNSPECIFIED, INITIAL ENCOUNTER FOR CLOSED FRACTURE: Chronic | ICD-10-CM

## 2020-11-19 DIAGNOSIS — Z98.890 OTHER SPECIFIED POSTPROCEDURAL STATES: Chronic | ICD-10-CM

## 2020-11-19 DIAGNOSIS — M77.32 CALCANEAL SPUR, LEFT FOOT: Chronic | ICD-10-CM

## 2020-11-19 DIAGNOSIS — R22.32 LOCALIZED SWELLING, MASS AND LUMP, LEFT UPPER LIMB: ICD-10-CM

## 2020-11-19 DIAGNOSIS — Z01.818 ENCOUNTER FOR OTHER PREPROCEDURAL EXAMINATION: ICD-10-CM

## 2020-11-19 LAB
ANION GAP SERPL CALC-SCNC: 5 MMOL/L — SIGNIFICANT CHANGE UP (ref 5–17)
BUN SERPL-MCNC: 24 MG/DL — HIGH (ref 7–23)
CALCIUM SERPL-MCNC: 9.2 MG/DL — SIGNIFICANT CHANGE UP (ref 8.4–10.5)
CHLORIDE SERPL-SCNC: 106 MMOL/L — SIGNIFICANT CHANGE UP (ref 96–108)
CO2 SERPL-SCNC: 28 MMOL/L — SIGNIFICANT CHANGE UP (ref 22–31)
CREAT SERPL-MCNC: 0.97 MG/DL — SIGNIFICANT CHANGE UP (ref 0.5–1.3)
GLUCOSE SERPL-MCNC: 100 MG/DL — HIGH (ref 70–99)
HCT VFR BLD CALC: 41.3 % — SIGNIFICANT CHANGE UP (ref 39–50)
HGB BLD-MCNC: 15 G/DL — SIGNIFICANT CHANGE UP (ref 13–17)
MCHC RBC-ENTMCNC: 30.8 PG — SIGNIFICANT CHANGE UP (ref 27–34)
MCHC RBC-ENTMCNC: 36.3 GM/DL — HIGH (ref 32–36)
MCV RBC AUTO: 84.8 FL — SIGNIFICANT CHANGE UP (ref 80–100)
NRBC # BLD: 0 /100 WBCS — SIGNIFICANT CHANGE UP (ref 0–0)
PLATELET # BLD AUTO: 184 K/UL — SIGNIFICANT CHANGE UP (ref 150–400)
POTASSIUM SERPL-MCNC: 4.1 MMOL/L — SIGNIFICANT CHANGE UP (ref 3.5–5.3)
POTASSIUM SERPL-SCNC: 4.1 MMOL/L — SIGNIFICANT CHANGE UP (ref 3.5–5.3)
RBC # BLD: 4.87 M/UL — SIGNIFICANT CHANGE UP (ref 4.2–5.8)
RBC # FLD: 12.2 % — SIGNIFICANT CHANGE UP (ref 10.3–14.5)
SODIUM SERPL-SCNC: 139 MMOL/L — SIGNIFICANT CHANGE UP (ref 135–145)
WBC # BLD: 4.91 K/UL — SIGNIFICANT CHANGE UP (ref 3.8–10.5)
WBC # FLD AUTO: 4.91 K/UL — SIGNIFICANT CHANGE UP (ref 3.8–10.5)

## 2020-11-19 PROCEDURE — 80048 BASIC METABOLIC PNL TOTAL CA: CPT

## 2020-11-19 PROCEDURE — 85027 COMPLETE CBC AUTOMATED: CPT

## 2020-11-19 PROCEDURE — G0463: CPT

## 2020-11-19 PROCEDURE — 36415 COLL VENOUS BLD VENIPUNCTURE: CPT

## 2020-11-19 RX ORDER — TRAZODONE HCL 50 MG
0 TABLET ORAL
Qty: 0 | Refills: 0 | DISCHARGE

## 2020-11-19 RX ORDER — SODIUM CHLORIDE 9 MG/ML
1000 INJECTION, SOLUTION INTRAVENOUS
Refills: 0 | Status: DISCONTINUED | OUTPATIENT
Start: 2020-11-24 | End: 2020-11-24

## 2020-11-19 NOTE — H&P PST ADULT - NSICDXPASTMEDICALHX_GEN_ALL_CORE_FT
PAST MEDICAL HISTORY:  Anxiety     Asymptomatic PVCs on occasion    Depression s/p ECT x 2 1990 and 1994    Herniated cervical disc unsure level    Insomnia difficulty falling and staying asleep    Kidney stones 2015, cystoscopy done    Mild obstructive sleep apnea no treatment    Primary osteoarthritis, right shoulder     Spondylolisthesis     Varicose veins     Venous insufficiency of both lower extremities

## 2020-11-19 NOTE — H&P PST ADULT - RS GEN PE MLT RESP DETAILS PC
airway patent/clear to auscultation bilaterally/respirations non-labored/no rhonchi/breath sounds equal/good air movement/no rales/no wheezes

## 2020-11-19 NOTE — H&P PST ADULT - NSICDXPASTSURGICALHX_GEN_ALL_CORE_FT
PAST SURGICAL HISTORY:  Ankle bone spur, left removal 2/1988    Broken jaw surgery 1994    History of arthroscopy of left shoulder 2014    S/P cystoscopy with ureteral stent placement 2014 h/o kidney stones stent removed 2015

## 2020-11-19 NOTE — H&P PST ADULT - HISTORY OF PRESENT ILLNESS
56 yo male presents with mass to the proximal phalangeal joint for the last 2 months. Denies change in size but reports discomfort when he bends the knuckle. Denies using any pain relief measures.

## 2020-11-19 NOTE — H&P PST ADULT - NSICDXPROBLEM_GEN_ALL_CORE_FT
PROBLEM DIAGNOSES  Problem: Mass of left finger  Assessment and Plan: excision of mass left index finger. medical clearance requested. stop osteobiflex and MVI. may take clonazepam AM of surgery if needed. surgical wash instructions reviewed and verbalized understanding. covid PCR to be done 11/21/20 at [address] [place] [address].

## 2020-11-19 NOTE — H&P PST ADULT - NSICDXFAMILYHX_GEN_ALL_CORE_FT
FAMILY HISTORY:  Father  Still living? No  Family history of DVT, Age at diagnosis: Age Unknown  Family history of pancreatic cancer, Age at diagnosis: Age Unknown    Sibling  Still living? Yes, Estimated age: Age Unknown  Family history of depression, Age at diagnosis: Age Unknown

## 2020-11-19 NOTE — HISTORY OF PRESENT ILLNESS
[No Pertinent Cardiac History] : no history of aortic stenosis, atrial fibrillation, coronary artery disease, recent myocardial infarction, or implantable device/pacemaker [No Pertinent Pulmonary History] : no history of asthma, COPD, sleep apnea, or smoking [No Adverse Anesthesia Reaction] : no adverse anesthesia reaction in self or family member [(Patient denies any chest pain, claudication, dyspnea on exertion, orthopnea, palpitations or syncope)] : Patient denies any chest pain, claudication, dyspnea on exertion, orthopnea, palpitations or syncope [Moderate (4-6 METs)] : Moderate (4-6 METs) [Aortic Stenosis] : no aortic stenosis [Atrial Fibrillation] : no atrial fibrillation [Coronary Artery Disease] : no coronary artery disease [Recent Myocardial Infarction] : no recent myocardial infarction [Implantable Device/Pacemaker] : no implantable device/pacemaker [Asthma] : no asthma [COPD] : no COPD [Sleep Apnea] : no sleep apnea [Smoker] : not a smoker [Self] : no previous adverse anesthesia reaction [Chronic Anticoagulation] : no chronic anticoagulation [Chronic Kidney Disease] : no chronic kidney disease [Diabetes] : no diabetes [FreeTextEntry1] : left index finger mass removal [FreeTextEntry2] : 11/24/2020 [FreeTextEntry3] : Dr. Zambrano

## 2020-11-19 NOTE — RESULTS/DATA
[] : results reviewed [de-identified] : oct 22, ekg sinus no acute st changes [de-identified] : covidd neg

## 2020-11-19 NOTE — H&P PST ADULT - MUSCULOSKELETAL
details… detailed exam joint swelling/left index proximal phalangeal joint/ROM intact/normal strength/no joint erythema/no joint warmth/no calf tenderness

## 2020-11-19 NOTE — H&P PST ADULT - NS ABD PE RECTAL EXAM
Can stop acyclovir prophylaxis at this time. Monitor for skin recurrences (development of vesicular lesions); if present to treat early (within 24-48 hours).    No further Peds ID follow-up indicated unless concern for frequent recurrences of HSV while off suppressive therapy.  
not examined

## 2020-11-23 ENCOUNTER — TRANSCRIPTION ENCOUNTER (OUTPATIENT)
Age: 55
End: 2020-11-23

## 2020-11-24 ENCOUNTER — APPOINTMENT (OUTPATIENT)
Dept: ORTHOPEDIC SURGERY | Facility: HOSPITAL | Age: 55
End: 2020-11-24

## 2020-11-24 ENCOUNTER — OUTPATIENT (OUTPATIENT)
Dept: OUTPATIENT SERVICES | Facility: HOSPITAL | Age: 55
LOS: 1 days | End: 2020-11-24
Payer: MEDICARE

## 2020-11-24 ENCOUNTER — RESULT REVIEW (OUTPATIENT)
Age: 55
End: 2020-11-24

## 2020-11-24 ENCOUNTER — NON-APPOINTMENT (OUTPATIENT)
Age: 55
End: 2020-11-24

## 2020-11-24 VITALS
SYSTOLIC BLOOD PRESSURE: 96 MMHG | RESPIRATION RATE: 16 BRPM | TEMPERATURE: 97 F | OXYGEN SATURATION: 98 % | HEART RATE: 64 BPM | DIASTOLIC BLOOD PRESSURE: 61 MMHG

## 2020-11-24 VITALS
WEIGHT: 159.17 LBS | RESPIRATION RATE: 16 BRPM | OXYGEN SATURATION: 100 % | DIASTOLIC BLOOD PRESSURE: 64 MMHG | HEART RATE: 70 BPM | SYSTOLIC BLOOD PRESSURE: 100 MMHG | HEIGHT: 72 IN | TEMPERATURE: 97 F

## 2020-11-24 DIAGNOSIS — R22.32 LOCALIZED SWELLING, MASS AND LUMP, LEFT UPPER LIMB: ICD-10-CM

## 2020-11-24 DIAGNOSIS — S02.609A FRACTURE OF MANDIBLE, UNSPECIFIED, INITIAL ENCOUNTER FOR CLOSED FRACTURE: Chronic | ICD-10-CM

## 2020-11-24 DIAGNOSIS — M77.32 CALCANEAL SPUR, LEFT FOOT: Chronic | ICD-10-CM

## 2020-11-24 DIAGNOSIS — Z96.0 PRESENCE OF UROGENITAL IMPLANTS: Chronic | ICD-10-CM

## 2020-11-24 DIAGNOSIS — Z98.890 OTHER SPECIFIED POSTPROCEDURAL STATES: Chronic | ICD-10-CM

## 2020-11-24 LAB — SARS-COV-2 N GENE NPH QL NAA+PROBE: NOT DETECTED

## 2020-11-24 PROCEDURE — 88305 TISSUE EXAM BY PATHOLOGIST: CPT | Mod: 26

## 2020-11-24 PROCEDURE — 26160 REMOVE TENDON SHEATH LESION: CPT | Mod: 79,F1

## 2020-11-24 PROCEDURE — 88305 TISSUE EXAM BY PATHOLOGIST: CPT

## 2020-11-24 PROCEDURE — 26115 EXC HAND LES SC < 1.5 CM: CPT | Mod: F1

## 2020-11-24 RX ORDER — HYDROMORPHONE HYDROCHLORIDE 2 MG/ML
0.5 INJECTION INTRAMUSCULAR; INTRAVENOUS; SUBCUTANEOUS
Refills: 0 | Status: DISCONTINUED | OUTPATIENT
Start: 2020-11-24 | End: 2020-11-24

## 2020-11-24 RX ORDER — CLONAZEPAM 1 MG
0 TABLET ORAL
Qty: 0 | Refills: 0 | DISCHARGE

## 2020-11-24 RX ORDER — SODIUM CHLORIDE 9 MG/ML
1000 INJECTION, SOLUTION INTRAVENOUS
Refills: 0 | Status: DISCONTINUED | OUTPATIENT
Start: 2020-11-24 | End: 2020-11-24

## 2020-11-24 RX ORDER — ONDANSETRON 8 MG/1
4 TABLET, FILM COATED ORAL ONCE
Refills: 0 | Status: DISCONTINUED | OUTPATIENT
Start: 2020-11-24 | End: 2020-11-24

## 2020-11-24 RX ORDER — IBUPROFEN 200 MG
1 TABLET ORAL
Qty: 45 | Refills: 0
Start: 2020-11-24 | End: 2020-12-08

## 2020-11-24 NOTE — ASU DISCHARGE PLAN (ADULT/PEDIATRIC) - ASU DC SPECIAL INSTRUCTIONSFT
Keep dressing dry and intact .  If dressing falls off cover incision site with bandage . reapply ace bandage. Cover while showering .   Do not get wet .  elevate when resting   follow up with Dr Zambrano in one week , call office for appt.

## 2020-11-24 NOTE — ASU DISCHARGE PLAN (ADULT/PEDIATRIC) - NURSING INSTRUCTIONS
All discharge information reviewed with patient including pain, safety, medication and follow up care

## 2020-11-24 NOTE — ASU PATIENT PROFILE, ADULT - PMH
Anxiety    Asymptomatic PVCs  on occasion  Depression  s/p ECT x 2 1990 and 1994  Herniated cervical disc  unsure level  Insomnia  difficulty falling and staying asleep  Kidney stones  2015, cystoscopy done  Mild obstructive sleep apnea  no treatment  Primary osteoarthritis, right shoulder    Spondylolisthesis    Varicose veins    Venous insufficiency of both lower extremities

## 2020-11-24 NOTE — ASU DISCHARGE PLAN (ADULT/PEDIATRIC) - PATIENT EDUCATION MATERIALS PROVIED
Pre-printed instructions given for other (specify) ibuprofen/Pre-printed instructions given for other (specify)

## 2020-11-24 NOTE — BRIEF OPERATIVE NOTE - NSICDXBRIEFPROCEDURE_GEN_ALL_CORE_FT
PROCEDURES:  Excision of mass of joint of finger 24-Nov-2020 16:46:26 left index finger Crissy Luz

## 2020-11-24 NOTE — ASU DISCHARGE PLAN (ADULT/PEDIATRIC) - CARE PROVIDER_API CALL
Josué Zambrano  ORTHOPAEDIC SURGERY  [address] [facility], [facility] [address]  [place] [address]  Phone: [phone]  Fax: [phone]  Follow Up Time: 1 week

## 2020-11-26 ENCOUNTER — TRANSCRIPTION ENCOUNTER (OUTPATIENT)
Age: 55
End: 2020-11-26

## 2020-11-27 PROBLEM — F41.9 ANXIETY DISORDER, UNSPECIFIED: Chronic | Status: ACTIVE | Noted: 2020-11-19

## 2020-11-27 PROBLEM — I83.90 ASYMPTOMATIC VARICOSE VEINS OF UNSPECIFIED LOWER EXTREMITY: Chronic | Status: ACTIVE | Noted: 2020-11-19

## 2020-11-27 PROBLEM — I49.3 VENTRICULAR PREMATURE DEPOLARIZATION: Chronic | Status: ACTIVE | Noted: 2020-11-19

## 2020-11-27 PROBLEM — M19.011 PRIMARY OSTEOARTHRITIS, RIGHT SHOULDER: Chronic | Status: ACTIVE | Noted: 2020-11-19

## 2020-11-27 PROBLEM — M43.10 SPONDYLOLISTHESIS, SITE UNSPECIFIED: Chronic | Status: ACTIVE | Noted: 2020-11-19

## 2020-11-27 PROBLEM — G47.33 OBSTRUCTIVE SLEEP APNEA (ADULT) (PEDIATRIC): Chronic | Status: ACTIVE | Noted: 2020-11-19

## 2020-11-27 PROBLEM — G47.00 INSOMNIA, UNSPECIFIED: Chronic | Status: ACTIVE | Noted: 2020-10-23

## 2020-11-27 PROBLEM — M50.20 OTHER CERVICAL DISC DISPLACEMENT, UNSPECIFIED CERVICAL REGION: Chronic | Status: ACTIVE | Noted: 2020-11-19

## 2020-11-27 PROBLEM — N20.0 CALCULUS OF KIDNEY: Chronic | Status: ACTIVE | Noted: 2020-10-23

## 2020-11-30 LAB — SURGICAL PATHOLOGY STUDY: SIGNIFICANT CHANGE UP

## 2020-12-01 ENCOUNTER — APPOINTMENT (OUTPATIENT)
Dept: ORTHOPEDIC SURGERY | Facility: CLINIC | Age: 55
End: 2020-12-01
Payer: MEDICARE

## 2020-12-01 PROCEDURE — 99024 POSTOP FOLLOW-UP VISIT: CPT

## 2020-12-01 NOTE — ADDENDUM
[FreeTextEntry1] : I, Toyin Demarco wrote this note acting as a scribe for Dr. Josué Zambrano on Dec 01, 2020.

## 2020-12-01 NOTE — HISTORY OF PRESENT ILLNESS
[de-identified] : s/p excision of left index finger mass [de-identified] : The patient is a 55 year old male who returns for the 1st postoperative visit after undergoing excision of left index finger mass at [facility]. The surgery was performed on 11/24/2020. The patient is recovering at home. He reports mild postoperative pain.  [de-identified] : Patient is WDWN, alert, and in no acute distress. Breathing is unlabored. He is grossly oriented to person, place, and time. \par \par Incision is healed. There are no signs of infection. Sutures were removed. Normal amount of postoperative edema and tenderness present.  [de-identified] : No imaging done today.  [de-identified] : Sutures were removed. Massage the scar with vitamin E oil. Gentle range of motion exercises were encouraged. Patient was encouraged to increase use of the hand. Follow up in 6 weeks.

## 2020-12-13 ENCOUNTER — EMERGENCY (EMERGENCY)
Facility: HOSPITAL | Age: 55
LOS: 1 days | Discharge: HOME | End: 2020-12-13
Attending: EMERGENCY MEDICINE | Admitting: EMERGENCY MEDICINE
Payer: MEDICARE

## 2020-12-13 VITALS
HEART RATE: 74 BPM | HEIGHT: 72 IN | TEMPERATURE: 97 F | SYSTOLIC BLOOD PRESSURE: 111 MMHG | RESPIRATION RATE: 18 BRPM | WEIGHT: 165.35 LBS | DIASTOLIC BLOOD PRESSURE: 72 MMHG | OXYGEN SATURATION: 98 %

## 2020-12-13 DIAGNOSIS — Z96.0 PRESENCE OF UROGENITAL IMPLANTS: Chronic | ICD-10-CM

## 2020-12-13 DIAGNOSIS — M77.32 CALCANEAL SPUR, LEFT FOOT: Chronic | ICD-10-CM

## 2020-12-13 DIAGNOSIS — Z98.890 OTHER SPECIFIED POSTPROCEDURAL STATES: Chronic | ICD-10-CM

## 2020-12-13 DIAGNOSIS — S02.609A FRACTURE OF MANDIBLE, UNSPECIFIED, INITIAL ENCOUNTER FOR CLOSED FRACTURE: Chronic | ICD-10-CM

## 2020-12-13 DIAGNOSIS — M79.645 PAIN IN LEFT FINGER(S): ICD-10-CM

## 2020-12-13 PROCEDURE — 99282 EMERGENCY DEPT VISIT SF MDM: CPT

## 2020-12-13 PROCEDURE — 99283 EMERGENCY DEPT VISIT LOW MDM: CPT

## 2020-12-13 NOTE — ED PROVIDER NOTE - OBJECTIVE STATEMENT
pt had surgery with Dr. Zambrano 3 weeks ago on the index finger of his L hand to remove a growth from his tendon.  over the last day pt noticed a swelling at the site of his surgery, comes to ER for advice and evaluation. denies any pain or fever.

## 2020-12-13 NOTE — ED PROVIDER NOTE - NSFOLLOWUPINSTRUCTIONS_ED_ALL_ED_FT
-- Follow up with Dr. Zambrano this week.    -- Return to the ER for worsening or persistent symptoms, and/or ANY NEW OR CONCERNING SYMPTOMS.    -- If you have difficulty following up, please call: [phone] to obtain a [facility] doctor or specialist who takes your insurance in your area.

## 2020-12-13 NOTE — ED PROVIDER NOTE - CARE PLAN
Principal Discharge DX:	Seroma of musculoskeletal structure after musculoskeletal system procedure

## 2020-12-13 NOTE — ED PROVIDER NOTE - CARE PROVIDER_API CALL
Josué Zambrano  ORTHOPAEDIC SURGERY  [address] [facility] [address] [facility], [address]  [place] [address]  Phone: [phone]  Fax: [phone]  Follow Up Time:

## 2020-12-13 NOTE — ED PROVIDER NOTE - MUSCULOSKELETAL, MLM
L hand index finger PIP with small area of swelling with underlying fluid collection, finger cool to touch, no redness, no signs of infection, FROM without pain

## 2020-12-13 NOTE — ED PROVIDER NOTE - PATIENT PORTAL LINK FT
You can access the FollowMyHealth Patient Portal offered by [facility] by registering at the following website: [email or web address]. By joining [employer]’s FollowMyHealth portal, you will also be able to view your health information using other applications (apps) compatible with our system.

## 2020-12-13 NOTE — ED ADULT TRIAGE NOTE - CHIEF COMPLAINT QUOTE
My left index finger is swollen and a bit painful. I had surgery on this finger end of November. I have no fever"

## 2020-12-14 ENCOUNTER — APPOINTMENT (OUTPATIENT)
Dept: ORTHOPEDIC SURGERY | Facility: CLINIC | Age: 55
End: 2020-12-14
Payer: MEDICARE

## 2020-12-14 DIAGNOSIS — R22.32 LOCALIZED SWELLING, MASS AND LUMP, LEFT UPPER LIMB: ICD-10-CM

## 2020-12-14 PROCEDURE — 99024 POSTOP FOLLOW-UP VISIT: CPT

## 2020-12-14 NOTE — HISTORY OF PRESENT ILLNESS
[de-identified] : s/p excision of left index finger mass [de-identified] : The patient is a 55 year old male who returns for the 2nd postoperative visit after undergoing excision of left index finger mass at [facility]. The surgery was performed on 11/24/2020. The patient is recovering at home. He reports mild postoperative pain. He states that he went to the ER on 12/13/2020 due to the pain and swelling in the finger.  [de-identified] : Patient is WDWN, alert, and in no acute distress. Breathing is unlabored. He is grossly oriented to person, place, and time. \par \par Incision is healed. There are no signs of infection. Normal amount of postoperative edema and tenderness present.  [de-identified] : No imaging done today.  [de-identified] : The underlying pathophysiology was reviewed with the patient. Treatment options were discussed including; observation, NSAIDS, analgesics, bracing, injection(s), physical therapy, and surgical intervention. \par \par The patient was advised to soak the hand in warm water and Epsom salt. \par NSAIDs as tolerated. \par Follow Up in 6 weeks if needed.

## 2020-12-14 NOTE — ADDENDUM
[FreeTextEntry1] : I, Toyin Demarco wrote this note acting as a scribe for Dr. Josué Zambrano on Dec 14, 2020.

## 2020-12-18 ENCOUNTER — APPOINTMENT (OUTPATIENT)
Dept: ORTHOPEDIC SURGERY | Facility: CLINIC | Age: 55
End: 2020-12-18
Payer: MEDICARE

## 2020-12-18 DIAGNOSIS — M77.8 OTHER ENTHESOPATHIES, NOT ELSEWHERE CLASSIFIED: ICD-10-CM

## 2020-12-18 DIAGNOSIS — M19.011 PRIMARY OSTEOARTHRITIS, RIGHT SHOULDER: ICD-10-CM

## 2020-12-18 DIAGNOSIS — M19.012 PRIMARY OSTEOARTHRITIS, RIGHT SHOULDER: ICD-10-CM

## 2020-12-18 PROCEDURE — 99213 OFFICE O/P EST LOW 20 MIN: CPT | Mod: 24

## 2021-01-05 ENCOUNTER — TRANSCRIPTION ENCOUNTER (OUTPATIENT)
Age: 56
End: 2021-01-05

## 2021-01-06 ENCOUNTER — APPOINTMENT (OUTPATIENT)
Dept: ORTHOPEDIC SURGERY | Facility: CLINIC | Age: 56
End: 2021-01-06
Payer: MEDICARE

## 2021-01-06 DIAGNOSIS — S99.911A UNSPECIFIED INJURY OF RIGHT ANKLE, INITIAL ENCOUNTER: ICD-10-CM

## 2021-01-06 PROCEDURE — 99214 OFFICE O/P EST MOD 30 MIN: CPT | Mod: 24

## 2021-01-06 NOTE — PHYSICAL EXAM
[de-identified] : General: Alert and oriented x3. In no acute distress. Pleasant in nature with a normal affect. No apparent respiratory distress.\par \par R Ankle Exam\par Skin: Clean, dry, intact\par Inspection: No obvious malalignment, no swelling, no effusion; no lymphadenopathy\par Pulses: 2+ DP/PT pulses\par ROM: R Ankle 10 degrees of dorsiflexion, 40 degrees of plantarflexion, 10 degrees of subtalar motion\par Tenderness: No tenderness over the lateral malleolus, no CFL/ATFL/PTFL pain. No medial malleolus pain, no deltoid ligament pain. No proximal fibular pain. No heel pain.\par Stability: Negative anterior/posterior drawer.\par Strength: 5/5 TA/GS/EHL\par Neuro: In tact to light touch throughout\par Additional tests: Negative Mortons test, Negative syndesmosis squeeze test.  [de-identified] : X-rays of the right foot obtained from [facility] on 10/26/2020 and reviewed by me today 01/06/2021. Revealed: No acute fracture or dislocation. Joint spaces are maintained.

## 2021-01-06 NOTE — DISCUSSION/SUMMARY
[de-identified] : Today I had a lengthy discussion with the patient regarding their right ankle pain. I have addressed all the patient's concerns surrounding the pathology of their condition. I recommend that the patient utilize Voltaren gel topically and an OTC ankle sleeve as well. I recommend the patient undergo a course of physical therapy for the right ankle 2-3 times a week for a total of 6-8 weeks. A prescription was given for the physical therapy today.  The patient was provided with a home exercise program in the office today. I would like to see the patient back in the office in 3-4 months PRN to reassess their condition. The patient understood and verbally agreed to the treatment plan. All of their questions were answered and they were satisfied with the visit. The patient should call the office if they have any questions or experience worsening symptoms.

## 2021-01-06 NOTE — ADDENDUM
[FreeTextEntry1] : I, Say Holder, acted solely as a scribe for Dr. Yosi Saldivar on this date 01/06/2021 .\par All medical record entries made by the Scribe were at my, Dr. Yosi Saldivar, direction and personally dictated by me on 01/06/2021 . I have reviewed the chart and agree that the record accurately reflects my personal performance of the history, physical exam, assessment and plan. I have also personally directed, reviewed, and agreed with the chart.

## 2021-01-06 NOTE — HISTORY OF PRESENT ILLNESS
[FreeTextEntry1] : 55 year old male presenting with right ankle pain. The patient’s pain is noted to be a 2/10. The patient's pain began on 10/26/2020 when he was working and twisted his right ankle when walking. The pain worsened throughout the day, and was having pain at rest as well. The patient went to urgent care and had x-rays. Since the injury he notes the pain has improved, and is having no pain today. He is currently taking no pain medication. No other complaints at this time.

## 2021-01-29 ENCOUNTER — APPOINTMENT (OUTPATIENT)
Dept: ORTHOPEDIC SURGERY | Facility: CLINIC | Age: 56
End: 2021-01-29
Payer: MEDICARE

## 2021-01-29 DIAGNOSIS — M75.101 UNSPECIFIED ROTATOR CUFF TEAR OR RUPTURE OF RIGHT SHOULDER, NOT SPECIFIED AS TRAUMATIC: ICD-10-CM

## 2021-01-29 PROCEDURE — 99213 OFFICE O/P EST LOW 20 MIN: CPT | Mod: 24

## 2021-03-12 ENCOUNTER — APPOINTMENT (OUTPATIENT)
Dept: ORTHOPEDIC SURGERY | Facility: CLINIC | Age: 56
End: 2021-03-12
Payer: MEDICARE

## 2021-03-12 PROCEDURE — 99213 OFFICE O/P EST LOW 20 MIN: CPT

## 2021-03-12 NOTE — ADDENDUM
[FreeTextEntry1] : I, Alpesh Cohen, acted solely as a scribe for Dr. Wan Coelho on this date 03/12/2021.\par All medical record entries made by the Scribe were at my, Dr. Wan Coelho, direction and personally dictated by me on 03/12/2021. I have reviewed the chart and agree that the record accurately reflects my personal performance of the history, physical exam, assessment and plan. I have also personally directed, reviewed, and agreed with the chart.

## 2021-03-12 NOTE — PHYSICAL EXAM
[UE] : 5/5 motor strength in bilateral upper extremities [de-identified] : Constitutional\par o Appearance : well-nourished, well developed, alert, in no acute distress \par Head and Face\par o Head :\par ¦ Inspection : atraumatic, normocephalic\par Neurologic\par o Mental Status Examination :\par ¦ Orientation : alert and oriented X 3\par Psychiatric\par o Mood and Affect: mood normal, affect appropriate \par Cardiovascular\par o Observation/Palpation : - no swelling,normal pulses, normal temperature \par Lymphatic\par o Additional Nodes : No palpable lymph nodes present \par \par Cervical Spine\par o Inspection/Palpation :\par ¦ Inspection : alignment midline, normal degree of lordosis present\par ¦ Skin : normal appearance, no masses or tenderness, trachea midline\par ¦ Palpation : musculature is nontender to palpation\par o Range of Motion : arc of motion full in all planes, no crepitance or pain with ROM\par \par Right Upper Extremity\par o Right Shoulder :\par ¦ Inspection/Palpation : no anterior capsular tenderness, no swelling, well-healed incisions, normal appearing biceps\par ¦ Range of Motion : full forward flexion with mild discomfort, full internal and external rotation without discomfort, no crepitance, no pain with cross chest maneuvers\par ¦ Strength : internal and external rotation 5/5, supraspinatus, forward flexion, and abduction 4+/5, external rotation at 90° of abduction 4+/5, biceps/triceps 5/5\par ¦ Stability : no joint instability on provocative testing\par ¦ Tests: Garcia negative, Neer minimally positive, Jasvir negative, negative drop arm test \par \par o Right Elbow :\par ¦ Inspection/Palpation : no medial or lateral epicondylar tenderness, no tenderness over the olecranon, no swelling or deformities\par ¦ Range of Motion : full and painless in all planes, crepitance with ROM\par ¦ Strength : flexion, extension, pronation, supination, 5/5\par ¦ Stability : no joint instability on provocative testing \par o Sensation : sensation intact to light touch\par ¦ Tests: Tinel’s Sign negative \par \par Left Upper Extremity\par o Left Shoulder :\par ¦ Inspection/Palpation : no tenderness, no swelling or deformities\par ¦ Range of Motion : full and painless in all planes, no crepitance\par ¦ Strength :  forward elevation, internal rotation, external rotation, external rotation at 90° of abduction, supraspinatus, adduction and abduction, biceps/triceps, 5/5 globally\par ¦ Stability : no joint instability on provocative testing\par ¦ Tests: Garcia negative, negative Neer and Jasvir test, negative drop arm test \par \par Gait: normal gait, no significant extremity swelling or lymphedema

## 2021-03-12 NOTE — HISTORY OF PRESENT ILLNESS
[de-identified] : 55 year old male presents 4.5 months s/p right shoulder arthroscopy done on 10/27/2020. He still notes difficulty raising his arm above his head. He has continued attending PT. He is thrilled with his progress and result to date. He is also complaining of clicking with elbow motion. He is very annoyed by the noise but denies significant elbow pain or loss of motion. 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

## 2021-03-12 NOTE — REASON FOR VISIT
[Follow-Up Visit] : a follow-up visit for [FreeTextEntry2] : s/p right shoulder arthroscopy done on 10/27/2020

## 2021-03-12 NOTE — DISCUSSION/SUMMARY
[de-identified] : I went over the pathophysiology of the patient's symptoms in great detail with the patient. We discussed the use of ice, Tylenol and anti-inflammatories to relieve pain. At this time, I am recommending that the patient goes to 1 more physical therapy session to learn a home exercise program. A prescription for PT was provided. He should focus on light weight and high repetition exercises. All of his questions were answered. He understands and consents to the plan. \par \par FU 2 months.\par after following a diligent HEP.

## 2021-03-29 ENCOUNTER — APPOINTMENT (OUTPATIENT)
Dept: FAMILY MEDICINE | Facility: CLINIC | Age: 56
End: 2021-03-29
Payer: MEDICARE

## 2021-03-29 VITALS
OXYGEN SATURATION: 99 % | WEIGHT: 169 LBS | SYSTOLIC BLOOD PRESSURE: 100 MMHG | RESPIRATION RATE: 12 BRPM | BODY MASS INDEX: 22.89 KG/M2 | HEIGHT: 72 IN | TEMPERATURE: 97.6 F | HEART RATE: 86 BPM | DIASTOLIC BLOOD PRESSURE: 68 MMHG

## 2021-03-29 DIAGNOSIS — Z71.89 OTHER SPECIFIED COUNSELING: ICD-10-CM

## 2021-03-29 PROCEDURE — 99214 OFFICE O/P EST MOD 30 MIN: CPT

## 2021-03-30 PROBLEM — Z71.89 IMMUNIZATION COUNSELING: Status: ACTIVE | Noted: 2021-03-30

## 2021-03-30 NOTE — HISTORY OF PRESENT ILLNESS
[FreeTextEntry1] : right shoulder stifness, decreased range of motion\par wants info re coviid vaccine\par needs refill on clonopin for occasional axniety [de-identified] : patient has been to several orthopedist regarding previous surgery of right shoulder, he now has limited rom while reaching overhead\par although no pain and able to work and function normally. patient desires surgery however was recommended to continue with nsaids/ice\par and PT program. wants to discuss surgery\par also wants infor re coviid vaccine

## 2021-04-02 ENCOUNTER — EMERGENCY (EMERGENCY)
Facility: HOSPITAL | Age: 56
LOS: 1 days | Discharge: HOME | End: 2021-04-02
Attending: STUDENT IN AN ORGANIZED HEALTH CARE EDUCATION/TRAINING PROGRAM | Admitting: STUDENT IN AN ORGANIZED HEALTH CARE EDUCATION/TRAINING PROGRAM
Payer: MEDICARE

## 2021-04-02 VITALS
DIASTOLIC BLOOD PRESSURE: 69 MMHG | SYSTOLIC BLOOD PRESSURE: 109 MMHG | WEIGHT: 160.06 LBS | RESPIRATION RATE: 19 BRPM | TEMPERATURE: 98 F | HEART RATE: 66 BPM | OXYGEN SATURATION: 100 % | HEIGHT: 72 IN

## 2021-04-02 DIAGNOSIS — Z98.890 OTHER SPECIFIED POSTPROCEDURAL STATES: Chronic | ICD-10-CM

## 2021-04-02 DIAGNOSIS — M77.32 CALCANEAL SPUR, LEFT FOOT: Chronic | ICD-10-CM

## 2021-04-02 DIAGNOSIS — S02.609A FRACTURE OF MANDIBLE, UNSPECIFIED, INITIAL ENCOUNTER FOR CLOSED FRACTURE: Chronic | ICD-10-CM

## 2021-04-02 DIAGNOSIS — Z96.0 PRESENCE OF UROGENITAL IMPLANTS: Chronic | ICD-10-CM

## 2021-04-02 PROCEDURE — 99283 EMERGENCY DEPT VISIT LOW MDM: CPT

## 2021-04-02 RX ORDER — HALOBETASOL PROPIONATE 0.5 MG/G
1 OINTMENT TOPICAL
Qty: 15 | Refills: 0
Start: 2021-04-02 | End: 2021-04-06

## 2021-04-02 RX ADMIN — Medication 60 MILLIGRAM(S): at 05:37

## 2021-04-02 NOTE — ED PROVIDER NOTE - PATIENT PORTAL LINK FT
You can access the FollowMyHealth Patient Portal offered by [facility] by registering at the following website: [email or web address]. By joining [employer]’s FollowMyHealth portal, you will also be able to view your health information using other applications (apps) compatible with our system.

## 2021-04-02 NOTE — ED PROVIDER NOTE - CLINICAL SUMMARY MEDICAL DECISION MAKING FREE TEXT BOX
likely allergic reaction dermatitis vs poison ivy dermatitis. No airway compromise, no drooling, no stridor, no respiratory distress, no muffled voice, no oral mucosal lesions to suggest SJS/TEN or EM. well appearing otherwise. will rx prednisone taper, close follow up with derm

## 2021-04-02 NOTE — ED PROVIDER NOTE - PROGRESS NOTE DETAILS
Able to open eyes and visualize number of fingers five feet away. No airway compromise, speaking full sentences, no respiratory distress. will dc home with prednisone taper, steroid cream, home remedies with close follow up. I have discussed with the patient about the ED workup, lab results, diagnostics results, plan for discharge home, need for follow-up with primary care physician/specialists, and return precautions. At this time, the patient does not require further workup in the ED. The patient is subjectively feeling better and would like to be discharged home. The patient had the opportunity to ask questions and I have answered all inquiries. The patient verbalizes understanding and agreement with the plan. The patient is hemodynamically stable, clinically well-appearing, ambulatory, mentating well and ready for discharge home.

## 2021-04-02 NOTE — ED ADULT TRIAGE NOTE - CHIEF COMPLAINT QUOTE
Pt cut poison ivy 2 days ago with pruners. Noticed redness under eyes last night, gradually worsened to upper lip & right eye swelling. Put applied cortisone 10 to face w/o relief. Pt denies SOB, throat swelling, or taking benadryl.

## 2021-04-02 NOTE — ED ADULT NURSE NOTE - OBJECTIVE STATEMENT
Pt A&OX4, cut poison ivy 2 days ago with pruners. Noticed redness under eyes last night, gradually worsened to upper lip & right eye swelling. Put applied cortisone 10 to face w/o relief. Pt denies difficulty, throat/tongue swelling, or taking benadryl.

## 2021-04-02 NOTE — ED PROVIDER NOTE - NSFOLLOWUPCLINICS_GEN_ALL_ED_FT
[facility]  Dermatology  [address] [place] [address]  Phone: [phone]  Fax: [phone]    [facility]  [address]  [place] [address]  Phone: [phone]  Fax: [phone]    [address] [address]  Phone: [phone]  Fax: [phone]    [facility]  Dermatology  [address] [facility] [address], [address] [address]  Phone: [phone]  Fax: [phone]    [facility]  Dermatology  [address], [address]  [place] [address]  Phone: [phone]  Fax: [phone]    [address] [address]  Phone: [phone]  Fax: [phone]    [facility]erm[facility]ol[facility]  Dermatology  [address]  [place] [address]  Phone: [phone]  Fax:     [facility]  Dermatology  [address] [address]  Phone: [phone]  Fax: [phone]

## 2021-04-02 NOTE — ED PROVIDER NOTE - NSFOLLOWUPINSTRUCTIONS_ED_ALL_ED_FT
Rash    A rash is a change in the color of the skin. A rash can also change the way your skin feels. There are many different conditions and factors that can cause a rash, most of which are not dangerous. Make sure to follow up with your primary care physician or a dermatologist as instructed by your health care provider.    SEEK IMMEDIATE MEDICAL CARE IF YOU HAVE ANY OF THE FOLLOWING SYMPTOMS: fever, blisters, a rash inside your mouth, vaginal or anal pain, or altered mental status.     Rest, drink plenty of fluids.  Advance activity as tolerated.  Continue all previously prescribed medications as directed.  Follow up with your PMD 2-3 days and bring copies of your results.  Follow up with dermatology in 3-5 days for further evaluation for your dermatitis.    Try soothing rash with ice packs and cool/wet compresses, over the counter calamine lotion.    Take prednisone 60 mg daily for 4 days followed by a 10-day taper in the following order:   - 50 mg daily for 2 days  - 40 mg daily for 2 days  - 30 mg daily for 2 days  - 20 mg daily for 2 days  - 10 mg daily for 2 days

## 2021-04-02 NOTE — ED PROVIDER NOTE - OBJECTIVE STATEMENT
55M PMHx of anxiety, depression s/p ECT, kidney stone, SELMA, OA, with facial rash x 3 days. On Tuesday, was cutting poison ivy with gloves, but then gradually began having partial facial redness with itching. Treated w/ OTC hydrocortisone cream but may have spread the lesions to other parts of his face. Denies any chest pain, lip swelling, tongue swelling, shortness of breath, abdominal pain, visual loss, foreign body sensation in eyes. 55M PMHx of anxiety, depression s/p ECT, kidney stone, SELMA, OA, with facial rash x 3 days. On Tuesday, was cutting poison ivy with gloves, but then gradually began having partial facial redness with itching. Treated w/ OTC hydrocortisone cream but may have spread the lesions to other parts of his face. Denies any chest pain, lip swelling, tongue swelling, shortness of breath, abdominal pain, visual loss, foreign body sensation in eyes, burning sensation in eyes, oral lesions, dysuria.

## 2021-04-02 NOTE — ED PROVIDER NOTE - NSFOLLOWUPCLINICSTOKEN_GEN_ALL_ED_FT
[id number]: || ||00\01||False;[id number]: || ||00\01||False;[id number]: || ||00\01||False;[id number]: || ||00\01||False;[id number]: || ||00\01||False;[id number]: || ||00\01||False;[id number]: || ||00\01||False;[id number]: || ||00\01||False;

## 2021-04-02 NOTE — ED PROVIDER NOTE - PHYSICAL EXAMINATION
VITAL SIGNS: I have reviewed nursing notes and confirm.   GEN: Well-developed; well-nourished; in no acute distress. Speaking full sentences.  SKIN: Warm, pink,  no diaphoresis, no cyanosis, well perfused. (+) erythematous rash diffuse throughout face,    HEAD: Normocephalic; atraumatic. No scalp lacerations, no abrasions.   NECK: Supple; non tender.   EYES: Pupils 3mm equal, round, reactive to light and accomodation, conjunctiva and sclera clear. Extra-ocular movements intact bilaterally.  ENT: No nasal discharge; airway clear. Trachea is midline. ORAL: No oropharyngeal exudates or erythema. Normal dentition.  CV: Regular rate and rhythm. S1, S2 normal; no murmurs, gallops, or rubs. No lower extremity pitting edema bilaterally. Capillary refill < 2 seconds throughout. Distal pulses intact 2+ throughout.  RESP: CTA bilaterally. No wheezes, rales, or rhonchi.   ABD: Normal bowel sounds, soft, non-distended, non-tender, no hepatosplenomegaly. No CVA tenderness bilaterally.  MSK: Normal range of motion and movement of all 4 extremities. No joint or muscular pain throughout. No clubbing.   BACK: No thoracolumbar midline or paravertebral tenderness. No step-offs or obvious deformities.  NEURO: Alert & oriented x 3, Grossly unremarkable. Sensory and motor intact throughout. No focal deficits. Gait: Fluid. Normal speech and coordination.   PSYCH: Cooperative, appropriate. VITAL SIGNS: I have reviewed nursing notes and confirm.   GEN: Well-developed; well-nourished; in no acute distress. Speaking full sentences.  SKIN: Warm, pink,  no diaphoresis, no cyanosis, well perfused. (+) erythematous rash diffuse throughout face, no vesicles, no crusting.  HEAD: Normocephalic; atraumatic. No scalp lacerations, no abrasions.   NECK: Supple; non tender. No stridor  EYES: Pupils 3mm equal, round, reactive to light and accomodation, conjunctiva and sclera clear. Extra-ocular movements intact bilaterally.  ENT: No nasal discharge; airway clear. Trachea is midline. ORAL: No oropharyngeal exudates or erythema. Normal dentition. No tongue or lip swelling.  CV: Regular rate and rhythm. S1, S2 normal; no murmurs, gallops, or rubs. No lower extremity pitting edema bilaterally. Capillary refill < 2 seconds throughout. Distal pulses intact 2+ throughout.  RESP: CTA bilaterally. No wheezes, rales, or rhonchi.   ABD: Normal bowel sounds, soft, non-distended, non-tender, no hepatosplenomegaly. No CVA tenderness bilaterally.  MSK: Normal range of motion and movement of all 4 extremities. No joint or muscular pain throughout. No clubbing.   BACK: No thoracolumbar midline or paravertebral tenderness. No step-offs or obvious deformities.  NEURO: Alert & oriented x 3, Grossly unremarkable. Sensory and motor intact throughout. No focal deficits. Gait: Fluid. Normal speech and coordination.   PSYCH: Cooperative, appropriate.

## 2021-05-11 ENCOUNTER — EMERGENCY (EMERGENCY)
Facility: HOSPITAL | Age: 56
LOS: 1 days | Discharge: HOME | End: 2021-05-11
Attending: EMERGENCY MEDICINE | Admitting: EMERGENCY MEDICINE
Payer: MEDICARE

## 2021-05-11 ENCOUNTER — TRANSCRIPTION ENCOUNTER (OUTPATIENT)
Age: 56
End: 2021-05-11

## 2021-05-11 VITALS
HEIGHT: 72 IN | TEMPERATURE: 97 F | SYSTOLIC BLOOD PRESSURE: 113 MMHG | HEART RATE: 78 BPM | DIASTOLIC BLOOD PRESSURE: 61 MMHG | WEIGHT: 160.06 LBS | RESPIRATION RATE: 16 BRPM | OXYGEN SATURATION: 97 %

## 2021-05-11 DIAGNOSIS — Z96.0 PRESENCE OF UROGENITAL IMPLANTS: Chronic | ICD-10-CM

## 2021-05-11 DIAGNOSIS — Z98.890 OTHER SPECIFIED POSTPROCEDURAL STATES: Chronic | ICD-10-CM

## 2021-05-11 DIAGNOSIS — M77.32 CALCANEAL SPUR, LEFT FOOT: Chronic | ICD-10-CM

## 2021-05-11 DIAGNOSIS — S02.609A FRACTURE OF MANDIBLE, UNSPECIFIED, INITIAL ENCOUNTER FOR CLOSED FRACTURE: Chronic | ICD-10-CM

## 2021-05-11 PROCEDURE — 99281 EMR DPT VST MAYX REQ PHY/QHP: CPT

## 2021-05-11 PROCEDURE — 99282 EMERGENCY DEPT VISIT SF MDM: CPT

## 2021-05-11 RX ORDER — METRONIDAZOLE 500 MG
500 TABLET ORAL ONCE
Refills: 0 | Status: DISCONTINUED | OUTPATIENT
Start: 2021-05-11 | End: 2021-05-11

## 2021-05-11 NOTE — ED PROVIDER NOTE - NSFOLLOWUPINSTRUCTIONS_ED_ALL_ED_FT
Call [phone] for an appointment with a dermatologist     Stay hydrated     Return to the ER if your symptoms worsen or for any other medical emergencies

## 2021-05-11 NOTE — ED PROVIDER NOTE - PATIENT PORTAL LINK FT
You can access the FollowMyHealth Patient Portal offered by [facility] by registering at the following website: [email or web address]. By joining [employer]’s FollowMyHealth portal, you will also be able to view your health information using other applications (apps) compatible with our system.

## 2021-05-11 NOTE — ED PROVIDER NOTE - PHYSICAL EXAMINATION
skin: well appearing sunburn across the lower back. +small grey patch noted. No signs of infection or cellulitis noted

## 2021-05-11 NOTE — ED PROVIDER NOTE - OBJECTIVE STATEMENT
54 y/o M presents for a wound check. Pt reports he developed a grey spot on his sunburn, he went to  and was told to f/u with dermatology for a biopsy, pt reports the dermatologist was closed so he came to the ER for the biopsy. Pt denies all medical complaints

## 2021-05-11 NOTE — ED ADULT NURSE NOTE - OBJECTIVE STATEMENT
Patient presents to ED from home complaining of "gray spot" on lower back associated with recent sunburn from this past Friday. Patient has area of redness with peeling skin. Patient has not applied anything topical to the area. Patient denies pain. Patient requesting evaluation.

## 2021-05-11 NOTE — ED PROVIDER NOTE - ATTENDING CONTRIBUTION TO CARE
I have personally seen and examined this patient. I have fully participated in the care of this patient. I have reviewed all pertinent clinical information, including history physical exam, plan and the PA's note and agree except as noted  56 y/o M presents for a wound check. Pt reports he developed a grey spot on his sunburn, he went to  and was told to f/u with dermatology for a biopsy, pt reports the dermatologist was closed so he came to the ER for the biopsy. Pt denies all medical complaints

## 2021-05-14 ENCOUNTER — APPOINTMENT (OUTPATIENT)
Dept: ORTHOPEDIC SURGERY | Facility: CLINIC | Age: 56
End: 2021-05-14

## 2021-06-28 NOTE — ED ADULT NURSE NOTE - TEMPLATE
Called pt's wife to provide contact for assistance in promoting pt's activity with supervision d/t memory. Pt's wife expressing interest and wanting contact information.    Orthopedic

## 2021-09-14 NOTE — ED PROVIDER NOTE - CLINICAL SUMMARY MEDICAL DECISION MAKING FREE TEXT BOX
56 y/o M presents for a wound check. Pt reports he developed a grey spot on his sunburn, he went to  and was told to f/u with dermatology for a biopsy, pt reports the dermatologist was closed so he came to the ER for the biopsy. Pt denies all medical complaints. PE as noted above. pt well appearing. He was informed we cant do biopsy in the ER. He verbalized understanding, will dc with [facility] number for a dermatologist PAST SURGICAL HISTORY:  H/O melanoma excision nose age 51    S/P ear surgery left side age 26    S/P knee surgery meniscus 1 0n right, 2 on left    S/P tonsillectomy child

## 2021-12-08 ENCOUNTER — APPOINTMENT (OUTPATIENT)
Dept: FAMILY MEDICINE | Facility: CLINIC | Age: 56
End: 2021-12-08
Payer: MEDICARE

## 2021-12-08 ENCOUNTER — NON-APPOINTMENT (OUTPATIENT)
Age: 56
End: 2021-12-08

## 2021-12-08 VITALS
RESPIRATION RATE: 12 BRPM | HEIGHT: 72 IN | HEART RATE: 65 BPM | DIASTOLIC BLOOD PRESSURE: 66 MMHG | BODY MASS INDEX: 20.18 KG/M2 | OXYGEN SATURATION: 99 % | SYSTOLIC BLOOD PRESSURE: 100 MMHG | WEIGHT: 149 LBS | TEMPERATURE: 98.6 F

## 2021-12-08 DIAGNOSIS — M25.311 OTHER INSTABILITY, RIGHT SHOULDER: ICD-10-CM

## 2021-12-08 DIAGNOSIS — Z11.59 ENCOUNTER FOR SCREENING FOR OTHER VIRAL DISEASES: ICD-10-CM

## 2021-12-08 DIAGNOSIS — M75.41 IMPINGEMENT SYNDROME OF RIGHT SHOULDER: ICD-10-CM

## 2021-12-08 DIAGNOSIS — Z01.818 ENCOUNTER FOR OTHER PREPROCEDURAL EXAMINATION: ICD-10-CM

## 2021-12-08 LAB
BILIRUB UR QL STRIP: NEGATIVE
CLARITY UR: CLEAR
COLLECTION METHOD: NORMAL
GLUCOSE UR-MCNC: NEGATIVE
HCG UR QL: 0.2 EU/DL
HGB UR QL STRIP.AUTO: ABNORMAL
KETONES UR-MCNC: NEGATIVE
LEUKOCYTE ESTERASE UR QL STRIP: NEGATIVE
NITRITE UR QL STRIP: NEGATIVE
PH UR STRIP: 5.5
PROT UR STRIP-MCNC: NEGATIVE
SP GR UR STRIP: >=1.03

## 2021-12-08 PROCEDURE — 99214 OFFICE O/P EST MOD 30 MIN: CPT | Mod: 25

## 2021-12-08 PROCEDURE — 81003 URINALYSIS AUTO W/O SCOPE: CPT | Mod: QW

## 2021-12-08 PROCEDURE — 93000 ELECTROCARDIOGRAM COMPLETE: CPT

## 2021-12-08 RX ORDER — SULFAMETHOXAZOLE AND TRIMETHOPRIM 800; 160 MG/1; MG/1
800-160 TABLET ORAL
Qty: 14 | Refills: 0 | Status: COMPLETED | COMMUNITY
Start: 2020-09-28 | End: 2021-12-08

## 2021-12-08 RX ORDER — OXYCODONE AND ACETAMINOPHEN 5; 325 MG/1; MG/1
5-325 TABLET ORAL
Qty: 15 | Refills: 0 | Status: COMPLETED | COMMUNITY
Start: 2020-10-27 | End: 2021-12-08

## 2021-12-08 RX ORDER — IBUPROFEN 600 MG/1
600 TABLET, FILM COATED ORAL
Qty: 45 | Refills: 0 | Status: COMPLETED | COMMUNITY
Start: 2020-11-24 | End: 2021-12-08

## 2021-12-08 RX ORDER — MUPIROCIN 20 MG/G
2 OINTMENT TOPICAL
Qty: 22 | Refills: 0 | Status: COMPLETED | COMMUNITY
Start: 2020-08-28 | End: 2021-12-08

## 2021-12-09 LAB
ALBUMIN SERPL ELPH-MCNC: 4.5 G/DL
ALP BLD-CCNC: 75 U/L
ALT SERPL-CCNC: 14 U/L
ANION GAP SERPL CALC-SCNC: 8 MMOL/L
APTT BLD: 33.8 SEC
AST SERPL-CCNC: 18 U/L
BASOPHILS # BLD AUTO: 0.04 K/UL
BASOPHILS NFR BLD AUTO: 0.8 %
BILIRUB SERPL-MCNC: 0.6 MG/DL
BUN SERPL-MCNC: 23 MG/DL
CALCIUM SERPL-MCNC: 9.6 MG/DL
CHLORIDE SERPL-SCNC: 108 MMOL/L
CO2 SERPL-SCNC: 27 MMOL/L
CREAT SERPL-MCNC: 0.92 MG/DL
EOSINOPHIL # BLD AUTO: 0.09 K/UL
EOSINOPHIL NFR BLD AUTO: 1.9 %
GLUCOSE SERPL-MCNC: 85 MG/DL
HCT VFR BLD CALC: 38.3 %
HGB BLD-MCNC: 13.4 G/DL
IMM GRANULOCYTES NFR BLD AUTO: 0.2 %
INR PPP: 1.06 RATIO
LYMPHOCYTES # BLD AUTO: 1.23 K/UL
LYMPHOCYTES NFR BLD AUTO: 25.5 %
MAN DIFF?: NORMAL
MCHC RBC-ENTMCNC: 30.4 PG
MCHC RBC-ENTMCNC: 35 GM/DL
MCV RBC AUTO: 86.8 FL
MONOCYTES # BLD AUTO: 0.33 K/UL
MONOCYTES NFR BLD AUTO: 6.8 %
NEUTROPHILS # BLD AUTO: 3.13 K/UL
NEUTROPHILS NFR BLD AUTO: 64.8 %
PLATELET # BLD AUTO: 136 K/UL
POTASSIUM SERPL-SCNC: 4.5 MMOL/L
PROT SERPL-MCNC: 6.7 G/DL
PT BLD: 12.6 SEC
RBC # BLD: 4.41 M/UL
RBC # FLD: 12.3 %
SODIUM SERPL-SCNC: 142 MMOL/L
WBC # FLD AUTO: 4.83 K/UL

## 2021-12-14 ENCOUNTER — APPOINTMENT (OUTPATIENT)
Dept: FAMILY MEDICINE | Facility: CLINIC | Age: 56
End: 2021-12-14
Payer: MEDICARE

## 2021-12-14 VITALS
WEIGHT: 149 LBS | DIASTOLIC BLOOD PRESSURE: 64 MMHG | HEIGHT: 72 IN | BODY MASS INDEX: 20.18 KG/M2 | SYSTOLIC BLOOD PRESSURE: 98 MMHG | HEART RATE: 68 BPM | OXYGEN SATURATION: 98 %

## 2021-12-14 PROCEDURE — G0439: CPT

## 2021-12-15 ENCOUNTER — TRANSCRIPTION ENCOUNTER (OUTPATIENT)
Age: 56
End: 2021-12-15

## 2021-12-15 LAB
25(OH)D3 SERPL-MCNC: 37.6 NG/ML
CHOLEST SERPL-MCNC: 158 MG/DL
ESTIMATED AVERAGE GLUCOSE: 97 MG/DL
HBA1C MFR BLD HPLC: 5 %
HDLC SERPL-MCNC: 53 MG/DL
LDLC SERPL CALC-MCNC: 88 MG/DL
NONHDLC SERPL-MCNC: 105 MG/DL
PSA SERPL-MCNC: 1.34 NG/ML
TRIGL SERPL-MCNC: 85 MG/DL
TSH SERPL-ACNC: 0.84 UIU/ML

## 2021-12-16 ENCOUNTER — TRANSCRIPTION ENCOUNTER (OUTPATIENT)
Age: 56
End: 2021-12-16

## 2022-01-20 ENCOUNTER — NON-APPOINTMENT (OUTPATIENT)
Age: 57
End: 2022-01-20

## 2022-02-28 ENCOUNTER — NON-APPOINTMENT (OUTPATIENT)
Age: 57
End: 2022-02-28

## 2022-03-14 ENCOUNTER — EMERGENCY (EMERGENCY)
Facility: HOSPITAL | Age: 57
LOS: 1 days | Discharge: HOME | End: 2022-03-14
Attending: EMERGENCY MEDICINE | Admitting: EMERGENCY MEDICINE
Payer: MEDICARE

## 2022-03-14 VITALS
OXYGEN SATURATION: 100 % | HEIGHT: 72 IN | HEART RATE: 77 BPM | TEMPERATURE: 98 F | WEIGHT: 160.06 LBS | RESPIRATION RATE: 16 BRPM | DIASTOLIC BLOOD PRESSURE: 60 MMHG | SYSTOLIC BLOOD PRESSURE: 98 MMHG

## 2022-03-14 DIAGNOSIS — M77.32 CALCANEAL SPUR, LEFT FOOT: Chronic | ICD-10-CM

## 2022-03-14 DIAGNOSIS — Z96.0 PRESENCE OF UROGENITAL IMPLANTS: Chronic | ICD-10-CM

## 2022-03-14 DIAGNOSIS — Z98.890 OTHER SPECIFIED POSTPROCEDURAL STATES: Chronic | ICD-10-CM

## 2022-03-14 DIAGNOSIS — S02.609A FRACTURE OF MANDIBLE, UNSPECIFIED, INITIAL ENCOUNTER FOR CLOSED FRACTURE: Chronic | ICD-10-CM

## 2022-03-14 PROCEDURE — 99283 EMERGENCY DEPT VISIT LOW MDM: CPT | Mod: FS

## 2022-03-14 PROCEDURE — 99283 EMERGENCY DEPT VISIT LOW MDM: CPT

## 2022-03-14 RX ORDER — LIDOCAINE 4 G/100G
1 CREAM TOPICAL ONCE
Refills: 0 | Status: COMPLETED | OUTPATIENT
Start: 2022-03-14 | End: 2022-03-14

## 2022-03-14 RX ORDER — CLONAZEPAM 1 MG
1 TABLET ORAL
Qty: 0 | Refills: 0 | DISCHARGE

## 2022-03-14 RX ORDER — IBUPROFEN 200 MG
1 TABLET ORAL
Qty: 28 | Refills: 0
Start: 2022-03-14 | End: 2022-03-20

## 2022-03-14 RX ORDER — ACETAMINOPHEN 500 MG
650 TABLET ORAL ONCE
Refills: 0 | Status: COMPLETED | OUTPATIENT
Start: 2022-03-14 | End: 2022-03-14

## 2022-03-14 RX ORDER — METHOCARBAMOL 500 MG/1
1 TABLET, FILM COATED ORAL
Qty: 21 | Refills: 0
Start: 2022-03-14 | End: 2022-03-20

## 2022-03-14 RX ADMIN — LIDOCAINE 1 PATCH: 4 CREAM TOPICAL at 10:26

## 2022-03-14 RX ADMIN — Medication 650 MILLIGRAM(S): at 10:26

## 2022-03-14 NOTE — ED PROVIDER NOTE - OBJECTIVE STATEMENT
55 y/o M with PMH of chronic back pain x 20 yrs, presents to the ED with c/o lower back pain x 2 days s/p walking 6 miles. Pt took advil 2 hours PTA with minimal relief in pain. Pt denies f/c, n/v, trauma to the back, paresthesias, motor/sensory deficits, bowel/bladder incontinence or all other complaints. pt has not seen an orthopedic for his back pain in few years

## 2022-03-14 NOTE — ED ADULT NURSE NOTE - OBJECTIVE STATEMENT
Pt arrives to ER c/o left lower back pain since yesterday, denies any new trauma or injury. Pt states he has hx of back pain, denies chest pain, denies sob, neg fever/chills, denies urinary symptoms Attending Attestation (For Attendings USE Only)...

## 2022-03-14 NOTE — ED PROVIDER NOTE - CLINICAL SUMMARY MEDICAL DECISION MAKING FREE TEXT BOX
Dr. Lindsay: 56M h/o chronic low back pain x 20 years p/w left sided lower back pain x 2 days after walking 6 miles in [place], no trauma, no radiation of pain, no numbness or tingling in legs, no bowel or bladder incontinence or retention, no hematuria or dysuria. Took Advil PTA with some relief. On exam pt is well appearing, nad, rrr, ctab, abdo soft/nt/nd, no midline spinal ttp, no CVAT, pelvis stable, normal gait. Back pain with no red flags, will tx symptomatically.

## 2022-03-14 NOTE — ED PROVIDER NOTE - NSFOLLOWUPINSTRUCTIONS_ED_ALL_ED_FT
Follow up with the orthopedic specialist within 1 week.     Take the prescribed medication as directed     Stay hydrated    Return to the ER if your symptoms worsen or for any other medical emergencies  *****************  Back Pain    Back pain is very common in adults. The cause of back pain is rarely dangerous and the pain often gets better over time. The cause of your back pain may not be known and may include strain of muscles or ligaments, degeneration of the spinal disks, or arthritis. Occasionally the pain may radiate down your leg(s). Over-the-counter medicines to reduce pain and inflammation are often the most helpful. Stretching and remaining active frequently helps the healing process.     SEEK IMMEDIATE MEDICAL CARE IF YOU HAVE ANY OF THE FOLLOWING SYMPTOMS: bowel or bladder control problems, unusual weakness or numbness in your arms or legs, nausea or vomiting, abdominal pain, fever, dizziness/lightheadedness.

## 2022-03-14 NOTE — ED PROVIDER NOTE - PATIENT PORTAL LINK FT
You can access the FollowMyHealth Patient Portal offered by [facility] by registering at the following website: [email or web address]. By joining [employer]’s FollowMyHealth portal, you will also be able to view your health information using other applications (apps) compatible with our system.

## 2022-03-14 NOTE — ED PROVIDER NOTE - ATTENDING CONTRIBUTION TO CARE
Dr. Lindsay: I performed a face to face bedside interview with patient regarding history of present illness, review of symptoms and past medical history. I completed an independent physical exam.  I have discussed patient's plan of care with PA.   I agree with note as stated above, having amended the EMR as needed to reflect my findings.   This includes HISTORY OF PRESENT ILLNESS, HIV, PAST MEDICAL/SURGICAL/FAMILY/SOCIAL HISTORY, ALLERGIES AND HOME MEDICATIONS, REVIEW OF SYSTEMS, PHYSICAL EXAM, and any PROGRESS NOTES during the time I functioned as the attending physician for this patient.    see mdm

## 2022-03-14 NOTE — ED PROVIDER NOTE - PHYSICAL EXAMINATION
msk: no midline spinal TTP.  +mild left paralumbar muscle TTP. SLR neg b/l. pt ambulatory in the ED   pt resting comfortably in the stretcher

## 2022-03-15 ENCOUNTER — APPOINTMENT (OUTPATIENT)
Dept: MRI IMAGING | Facility: HOSPITAL | Age: 57
End: 2022-03-15

## 2022-03-24 ENCOUNTER — APPOINTMENT (OUTPATIENT)
Dept: ORTHOPEDIC SURGERY | Facility: CLINIC | Age: 57
End: 2022-03-24

## 2022-03-25 ENCOUNTER — APPOINTMENT (OUTPATIENT)
Dept: MRI IMAGING | Facility: CLINIC | Age: 57
End: 2022-03-25
Payer: MEDICARE

## 2022-03-25 ENCOUNTER — APPOINTMENT (OUTPATIENT)
Dept: RADIOLOGY | Facility: CLINIC | Age: 57
End: 2022-03-25
Payer: MEDICARE

## 2022-03-25 ENCOUNTER — OUTPATIENT (OUTPATIENT)
Dept: OUTPATIENT SERVICES | Facility: HOSPITAL | Age: 57
LOS: 1 days | End: 2022-03-25
Payer: MEDICARE

## 2022-03-25 DIAGNOSIS — S02.609A FRACTURE OF MANDIBLE, UNSPECIFIED, INITIAL ENCOUNTER FOR CLOSED FRACTURE: Chronic | ICD-10-CM

## 2022-03-25 DIAGNOSIS — M77.32 CALCANEAL SPUR, LEFT FOOT: Chronic | ICD-10-CM

## 2022-03-25 DIAGNOSIS — Z98.890 OTHER SPECIFIED POSTPROCEDURAL STATES: Chronic | ICD-10-CM

## 2022-03-25 DIAGNOSIS — Z96.0 PRESENCE OF UROGENITAL IMPLANTS: Chronic | ICD-10-CM

## 2022-03-25 DIAGNOSIS — Z00.8 ENCOUNTER FOR OTHER GENERAL EXAMINATION: ICD-10-CM

## 2022-03-25 PROCEDURE — 23350 INJECTION FOR SHOULDER X-RAY: CPT | Mod: LT

## 2022-03-25 PROCEDURE — 73222 MRI JOINT UPR EXTREM W/DYE: CPT | Mod: 26,LT,MH

## 2022-03-25 PROCEDURE — 23350 INJECTION FOR SHOULDER X-RAY: CPT

## 2022-03-25 PROCEDURE — 77002 NEEDLE LOCALIZATION BY XRAY: CPT | Mod: 26

## 2022-03-25 PROCEDURE — 73222 MRI JOINT UPR EXTREM W/DYE: CPT | Mod: ME

## 2022-03-25 PROCEDURE — 77002 NEEDLE LOCALIZATION BY XRAY: CPT

## 2022-03-25 PROCEDURE — G1004: CPT

## 2022-04-07 ENCOUNTER — APPOINTMENT (OUTPATIENT)
Dept: ORTHOPEDIC SURGERY | Facility: CLINIC | Age: 57
End: 2022-04-07
Payer: MEDICARE

## 2022-04-07 DIAGNOSIS — G89.29 LOW BACK PAIN, UNSPECIFIED: ICD-10-CM

## 2022-04-07 DIAGNOSIS — M47.816 SPONDYLOSIS W/OUT MYELOPATHY OR RADICULOPATHY, LUMBAR REGION: ICD-10-CM

## 2022-04-07 DIAGNOSIS — M54.50 LOW BACK PAIN, UNSPECIFIED: ICD-10-CM

## 2022-04-07 PROCEDURE — 72100 X-RAY EXAM L-S SPINE 2/3 VWS: CPT

## 2022-04-07 PROCEDURE — 99214 OFFICE O/P EST MOD 30 MIN: CPT

## 2022-04-07 NOTE — PHYSICAL EXAM
[LE] : 5/5 motor strength in bilateral lower extremities [Knee] : patellar 2+ and symmetric bilaterally [Ankle] : ankle 2+ and symmetric bilaterally [Normal RLE] : Right Lower Extremity: No scars, rashes, lesions, ulcers, skin intact [Normal LLE] : Left Lower Extremity: No scars, rashes, lesions, ulcers, skin intact [Normal DTR Reflexes] : DTR reflexes normal [Normal Touch] : sensation intact for touch [Normal] : No costovertebral angle tenderness and no spinal tenderness [Sacroiliac Wali-Fabere Test Left Side] : negative Prakash [Left Hip Was Examined] : negative impingement test [Poor Appearance] : well-appearing [Acute Distress] : not in acute distress [Obese] : not obese [de-identified] : Patient with a noted air cast brace on the right ankle which he uses for an ankle injury [de-identified] : heel and toe walk is intact\par negative tension sign of bilateral LEs [de-identified] : xrays of the lumbar spine AP and lateral demonstrating L2-3 and L5-S1 narrowed disc spaces. End plate vertebral spurs seen on the lateral view.No obvious fracture seen.

## 2022-04-07 NOTE — HISTORY OF PRESENT ILLNESS
[Pain Location] : pain [Lumbar] : lumbar region [___ mths] : [unfilled] month(s) ago [2] : a current pain level of 2/10 [4] : a maximum pain level of 4/10 [Stable] : stable [de-identified] : Patient is a 55 yo male with an initial consultation for low back pain with no radiculopathy to the lower extremities. Patient denies of any trauma to produce the low back pain that re-occured one month ago. Last episode of back pain was 20 years ago and was instructed of stretching exercises that helped until this point.\par Patient denies of trauma, nor tingling or numbness of the lower extremities

## 2022-04-07 NOTE — DISCUSSION/SUMMARY
[de-identified] : The underlying pathophysiology was reviewed in great detail with the patient as well as the various treatment options, including ice, analgesics, NSAIDS, Physical therapy, steroid injections.\par Anti-inflammatory use can help with the inflammation\par Home exercises of physician guided list of stretching\par F/U PRN

## 2022-04-11 ENCOUNTER — APPOINTMENT (OUTPATIENT)
Dept: FAMILY MEDICINE | Facility: CLINIC | Age: 57
End: 2022-04-11
Payer: MEDICARE

## 2022-04-11 VITALS
HEIGHT: 72 IN | BODY MASS INDEX: 21.67 KG/M2 | HEART RATE: 85 BPM | OXYGEN SATURATION: 98 % | TEMPERATURE: 97.6 F | WEIGHT: 160 LBS | SYSTOLIC BLOOD PRESSURE: 100 MMHG | RESPIRATION RATE: 14 BRPM | DIASTOLIC BLOOD PRESSURE: 70 MMHG

## 2022-04-11 PROBLEM — Z11.59 SCREENING FOR VIRAL DISEASE: Status: ACTIVE | Noted: 2021-12-08

## 2022-04-11 PROCEDURE — 99213 OFFICE O/P EST LOW 20 MIN: CPT

## 2022-04-20 ENCOUNTER — APPOINTMENT (OUTPATIENT)
Dept: ORTHOPEDIC SURGERY | Facility: CLINIC | Age: 57
End: 2022-04-20
Payer: MEDICARE

## 2022-04-20 ENCOUNTER — NON-APPOINTMENT (OUTPATIENT)
Age: 57
End: 2022-04-20

## 2022-04-20 VITALS
HEIGHT: 72 IN | BODY MASS INDEX: 21.67 KG/M2 | WEIGHT: 160 LBS | DIASTOLIC BLOOD PRESSURE: 65 MMHG | HEART RATE: 69 BPM | SYSTOLIC BLOOD PRESSURE: 96 MMHG

## 2022-04-20 PROCEDURE — 99214 OFFICE O/P EST MOD 30 MIN: CPT

## 2022-04-22 ENCOUNTER — APPOINTMENT (OUTPATIENT)
Dept: FAMILY MEDICINE | Facility: CLINIC | Age: 57
End: 2022-04-22
Payer: MEDICARE

## 2022-04-22 VITALS
BODY MASS INDEX: 21.67 KG/M2 | HEART RATE: 76 BPM | DIASTOLIC BLOOD PRESSURE: 60 MMHG | RESPIRATION RATE: 12 BRPM | HEIGHT: 72 IN | SYSTOLIC BLOOD PRESSURE: 100 MMHG | OXYGEN SATURATION: 97 % | WEIGHT: 160 LBS | TEMPERATURE: 97.5 F

## 2022-04-22 DIAGNOSIS — M25.312 OTHER INSTABILITY, LEFT SHOULDER: ICD-10-CM

## 2022-04-22 DIAGNOSIS — S43.432A SUPERIOR GLENOID LABRUM LESION OF LEFT SHOULDER, INITIAL ENCOUNTER: ICD-10-CM

## 2022-04-22 PROCEDURE — 99213 OFFICE O/P EST LOW 20 MIN: CPT

## 2022-04-25 NOTE — ED ADULT TRIAGE NOTE - LOCATION:
Georgiana Chapa [facility] [phone] . Thank you.   Left arm;

## 2022-04-27 ENCOUNTER — APPOINTMENT (OUTPATIENT)
Dept: FAMILY MEDICINE | Facility: CLINIC | Age: 57
End: 2022-04-27

## 2022-05-17 NOTE — ED ADULT NURSE NOTE - OBJECTIVE STATEMENT
Unable to provide 24 holter results of normal functioning of pacemaker due to full mailbox. Will follow up.     Pt states his left pointer finger became swollen today at one of the joints. Pt states it hurts a little bit when he bends the finger. Pt states he previously had surgery on the finger for seroma. No obvious sign of deformity, no bruising or bleeding noted.

## 2022-05-18 ENCOUNTER — EMERGENCY (EMERGENCY)
Facility: HOSPITAL | Age: 57
LOS: 1 days | Discharge: HOME | End: 2022-05-18
Attending: STUDENT IN AN ORGANIZED HEALTH CARE EDUCATION/TRAINING PROGRAM | Admitting: STUDENT IN AN ORGANIZED HEALTH CARE EDUCATION/TRAINING PROGRAM
Payer: MEDICARE

## 2022-05-18 VITALS
HEART RATE: 76 BPM | RESPIRATION RATE: 16 BRPM | OXYGEN SATURATION: 100 % | HEIGHT: 72 IN | DIASTOLIC BLOOD PRESSURE: 64 MMHG | SYSTOLIC BLOOD PRESSURE: 109 MMHG | WEIGHT: 160.06 LBS | TEMPERATURE: 98 F

## 2022-05-18 DIAGNOSIS — Z98.890 OTHER SPECIFIED POSTPROCEDURAL STATES: Chronic | ICD-10-CM

## 2022-05-18 DIAGNOSIS — Z96.0 PRESENCE OF UROGENITAL IMPLANTS: Chronic | ICD-10-CM

## 2022-05-18 DIAGNOSIS — M77.32 CALCANEAL SPUR, LEFT FOOT: Chronic | ICD-10-CM

## 2022-05-18 DIAGNOSIS — S02.609A FRACTURE OF MANDIBLE, UNSPECIFIED, INITIAL ENCOUNTER FOR CLOSED FRACTURE: Chronic | ICD-10-CM

## 2022-05-18 PROCEDURE — 99283 EMERGENCY DEPT VISIT LOW MDM: CPT | Mod: FS

## 2022-05-18 PROCEDURE — 99283 EMERGENCY DEPT VISIT LOW MDM: CPT | Mod: 25

## 2022-05-18 PROCEDURE — 73030 X-RAY EXAM OF SHOULDER: CPT

## 2022-05-18 PROCEDURE — 73030 X-RAY EXAM OF SHOULDER: CPT | Mod: 26,RT

## 2022-05-18 NOTE — ED PROVIDER NOTE - OBJECTIVE STATEMENT
Patient is a 57 yo male with right shoulder pain x 1 week; worse with movement; feels clicking; patient is right hand dominant; no known trauma or injury; patient had shoulder surgery in dec. 2021; has not followed up with orthopedist.

## 2022-05-18 NOTE — ED PROVIDER NOTE - PATIENT PORTAL LINK FT
You can access the FollowMyHealth Patient Portal offered by [facility] by registering at the following website: [email or web address]. By joining [employer]’s FollowMyHealth portal, you will also be able to view your health information using other applications (apps) compatible with our system.

## 2022-05-18 NOTE — ED PROVIDER NOTE - CLINICAL SUMMARY MEDICAL DECISION MAKING FREE TEXT BOX
55 yo male with 1 week of right shoulder pain, clicking; hx of rotator cuff repair in December 2021; no new trauma or injury; xrays; pain control; re-eval

## 2022-05-18 NOTE — ED PROVIDER NOTE - MUSCULOSKELETAL, MLM
Spine appears normal, range of motion is not limited, old surgical incisions to right anterior/lateral shoulder;

## 2022-05-18 NOTE — ED PROVIDER NOTE - NS ED ATTENDING STATEMENT MOD
Attending Only This was a shared visit with the CONCETTA. I reviewed and verified the documentation and independently performed the documented:

## 2022-05-18 NOTE — ED PROVIDER NOTE - PROGRESS NOTE DETAILS
FIDE Rush: I participated in the care/eval of this patient. XR images reviewed with Dr. Mendoza, no fx noted. will dc with ortho f/u

## 2022-05-18 NOTE — ED PROVIDER NOTE - ATTENDING APP SHARED VISIT CONTRIBUTION OF CARE
I, Gilda Mendoza DO,  performed the initial face to face bedside interview with this patient regarding history of present illness, review of symptoms and relevant past medical, social and family history.  I completed an independent physical examination.  I was the initial provider who evaluated this patient.   I personally saw the patient and performed a substantive portion of the visit including all aspects of the medical decision making.  I have signed out the follow up of any pending tests (i.e. labs, radiological studies) to the ACP.  I have communicated the patient’s plan of care and disposition with the ACP.

## 2022-05-18 NOTE — ED PROVIDER NOTE - CARE PROVIDER_API CALL
Jose Monae)  Orthopaedic Sports Medicine; Orthopaedic Surgery  [address] [address]  Phone: [phone]  Fax: [phone]  Follow Up Time:

## 2022-05-18 NOTE — ED PROVIDER NOTE - NSFOLLOWUPINSTRUCTIONS_ED_ALL_ED_FT
Follow up with the orthopedic specialist within 1-2 weeks.     Take over the counter tylenol or motrin as needed for pain. Rest, Ice 15 min on/ 15 min off    Stay hydrated    Return to the ER if your symptoms worsen or for any other medical emergencies  *************    Shoulder Pain      Many things can cause shoulder pain, including:  •An injury.      •Moving the shoulder in the same way again and again (overuse).      •Joint pain (arthritis).      Pain can come from:  •Swelling and irritation (inflammation) of any part of the shoulder.      •An injury to the shoulder joint.    •An injury to:  •Tissues that connect muscle to bone (tendons).      •Tissues that connect bones to each other (ligaments).      •Bones.          Follow these instructions at home:    Watch for changes in your symptoms. Let your doctor know about them. Follow these instructions to help with your pain.    If you have a sling:     •Wear the sling as told by your doctor. Remove it only as told by your doctor.    •Loosen the sling if your fingers:  •Tingle.       •Become numb.       •Turn cold and blue.         •Keep the sling clean.    •If the sling is not waterproof:  •Do not let it get wet.      •Take the sling off when you shower or bathe.          Managing pain, stiffness, and swelling    •If told, put ice on the painful area:  • Put ice in a plastic bag.       •Place a towel between your skin and the bag.       •Leave the ice on for 20 minutes, 2–3 times a day. Stop putting ice on if it does not help with the pain.        •Squeeze a soft ball or a foam pad as much as possible. This prevents swelling in the shoulder. It also helps to strengthen the arm.      General instructions     •Take over-the-counter and prescription medicines only as told by your doctor.      •Keep all follow-up visits as told by your doctor. This is important.        Contact a doctor if:    •Your pain gets worse.      •Medicine does not help your pain.      •You have new pain in your arm, hand, or fingers.        Get help right away if:  •Your arm, hand, or fingers:  •Tingle.      •Are numb.      •Are swollen.      •Are painful.      •Turn white or blue.          Summary    •Shoulder pain can be caused by many things. These include injury, moving the shoulder in the same away again and again, and joint pain.      •Watch for changes in your symptoms. Let your doctor know about them.      •This condition may be treated with a sling, ice, and pain medicine.      •Contact your doctor if the pain gets worse or you have new pain. Get help right away if your arm, hand, or fingers tingle or get numb, swollen, or painful.      •Keep all follow-up visits as told by your doctor. This is important.      This information is not intended to replace advice given to you by your health care provider. Make sure you discuss any questions you have with your health care provider.

## 2022-05-19 ENCOUNTER — APPOINTMENT (OUTPATIENT)
Dept: FAMILY MEDICINE | Facility: CLINIC | Age: 57
End: 2022-05-19
Payer: MEDICARE

## 2022-05-19 VITALS
TEMPERATURE: 98.2 F | BODY MASS INDEX: 21.32 KG/M2 | HEART RATE: 98 BPM | RESPIRATION RATE: 12 BRPM | OXYGEN SATURATION: 98 % | HEIGHT: 72 IN | DIASTOLIC BLOOD PRESSURE: 62 MMHG | SYSTOLIC BLOOD PRESSURE: 92 MMHG | WEIGHT: 157.38 LBS

## 2022-05-19 PROCEDURE — 99213 OFFICE O/P EST LOW 20 MIN: CPT

## 2022-05-19 RX ORDER — CLONAZEPAM 1 MG/1
1 TABLET ORAL
Qty: 30 | Refills: 0 | Status: DISCONTINUED | COMMUNITY
Start: 2019-04-04 | End: 2022-05-19

## 2022-05-19 RX ORDER — TRAZODONE HYDROCHLORIDE 50 MG/1
50 TABLET ORAL
Qty: 30 | Refills: 3 | Status: DISCONTINUED | COMMUNITY
Start: 2019-02-15 | End: 2022-05-19

## 2022-05-19 NOTE — HISTORY OF PRESENT ILLNESS
[FreeTextEntry1] : f/u [de-identified] : Patient recently had moderna/shingrx vax, was going to be prescribed percocet from ortho wondering if there is an interaction. Patient also reports he would like medication for depression. He reports feeling negative everyday, no affects on his function. He denies any thoughts of hurting himself or anyone else, no suicidal/homicidal ideation. Patient would like to start meds again, took prozac and other medications in past but eventually lost effectiveness. He saw dr. guzman for medication in past. He as tried counseling in past but does not like it, he is not interested in restarting. Pt denies any SOB, cough, chest pain, palpitations, N/V/D/C, fever, or chills. No other health concerns today.

## 2022-05-19 NOTE — PHYSICAL EXAM
[Normal Sclera/Conjunctiva] : normal sclera/conjunctiva [Normal] : normal rate, regular rhythm, normal S1 and S2 and no murmur heard [Alert and Oriented x3] : oriented to person, place, and time [Normal Insight/Judgement] : insight and judgment were intact [Flat] : flat

## 2022-05-19 NOTE — PLAN
[FreeTextEntry1] : \par Discussed no interaction after having vaccines just don’t take prior as per up to date. Discussed seeing if pt would like to get set up with psych social worker gloria chi, declines at this time will let us know if he changes his mind. Declines pysch referral, willing to try ssri again. Discussed if further medicaiton needed we will have to get set up with psych. Patient verbalized understanding. He denies any thoughts of hurting himself or anyone else, no suicidal/homicidal ideation. F/u in several weeks. Advised patient to call with any questions or concerns. Patient verbalized understanding. \par \par Counseled patient that medication works to help treat anxiety and depression. Educated black box warning in children and young adults could increase chance of suicidal thoughts or actions, advised to call doctor right away if this change occurs. Discussed with patient avoid driving/tasks you need to be alert for until you see how drug affects you, avoid/limit alcohol while on medication, discuss with PCP before starting other prescription/OTC medications/other substances, could take 4 weeks or longer to reach full efficacy, and may have greater side effects in 65+. With patients planning to become pregnant or breast feeding, please discuss with PCP prior while on medication. Educated patient on common side effects including but not limited to nausea, diarrhea, constipation, drowsiness, insomnia, dry mouth, diaphoresis, headache, decreased libido, and ejaculatory disorders. Advised patient to call PCP right away if agitation; change in balance; confusion; hallucinations; fever; fast or abnormal heartbeat; flushing; muscle twitching or stiffness; seizures; shivering or shaking; sweating a lot; severe diarrhea, upset stomach, or throwing up; or very bad headache. Educated to take a missed dose as soon as patient think about it. If it is close to the time for your next dose, skip the missed dose and go back to your normal time. Do not take 2 doses at the same time or extra doses. Advised patient to not stop taking medication without discussing with PCP first as withdrawal symptoms and side effects may occur.

## 2022-06-28 ENCOUNTER — APPOINTMENT (OUTPATIENT)
Dept: NEUROLOGY | Facility: CLINIC | Age: 57
End: 2022-06-28

## 2022-06-28 VITALS
SYSTOLIC BLOOD PRESSURE: 101 MMHG | WEIGHT: 160 LBS | BODY MASS INDEX: 21.67 KG/M2 | HEIGHT: 72 IN | DIASTOLIC BLOOD PRESSURE: 66 MMHG | HEART RATE: 77 BPM

## 2022-06-28 DIAGNOSIS — R20.0 ANESTHESIA OF SKIN: ICD-10-CM

## 2022-06-28 PROCEDURE — 99204 OFFICE O/P NEW MOD 45 MIN: CPT

## 2022-06-28 RX ORDER — ESCITALOPRAM OXALATE 5 MG/1
5 TABLET ORAL
Qty: 60 | Refills: 0 | Status: DISCONTINUED | COMMUNITY
Start: 2022-05-19 | End: 2022-06-28

## 2022-06-28 NOTE — DISCUSSION/SUMMARY
[FreeTextEntry1] : Mr. Desai is a 57 year old man with decreased sensation over the right shoulder since surgery for a SLAP repair.\par He has intact strength and symmetric reflexes in his upper extremities.\par I explained that he may have had some injury to a superficial branch of a nerve.\par I do not think that an EMG/NCV would help as it is not designed to test these small branches of nerves.\par I do not see any suggestion of a cervical radiculopathy to warrant MRI cervical spine.\par He is not having pain at this time so I will defer neuropathic pain medications.\par It is possible that the numbness will improve with time.\par \par In regard to his question about neurological side effects with antidepressants, I explained that every medication has potential side effects, some more than others.  The benefits of medications should be weighed against any potential side effects and discussed with his prescribing physician.\par \par Mr. Desai should feel free to follow-up with any worsening of this problem or new neurological complaints.

## 2022-06-28 NOTE — PHYSICAL EXAM
[FreeTextEntry1] : Examination:\par Constitutional: normal, no apparent distress\par Eyes: normal conjunctiva b/l, no ptosis, visual fields full\par Respiratory: no respiratory distress, normal effort, normal auscultation\par Cardiovascular: normal rate, rhythm, no murmurs\par Neck: supple, no masses\par Vascular: carotids normal\par Skin: normal color, no rashes\par Psych: normal mood, affect\par \par Neurological:\par Memory: normal memory, oriented to person, place, time\par Language intact/no aphasia\par Cranial Nerves: II-XII intact, Pupils equally round and reactive to light, ocular muscles/movements intact, no ptosis, no facial weakness, tongue protrudes normally in the midline, \par Motor: normal tone, no pronator drift, full strength in all four extremities in the proximal and distal muscle groups\par Coordination: Fine motor movements intact, rapid alternating movements intact, finger to nose intact bilaterally\par Sensory: Small area of decreased sensation over medial deltoid on left, intact to light touch, vibration, joint position sense intact, negative Romberg examination\par DTRs: symmetric, 1+ in b/l triceps, 1+ in b/l biceps, 1+ in b/l brachioradialis, 1+ in bilateral patellars, 1+ in left ankle, absent on right ankle, Babinskis negative bilaterally\par Gait: narrow based, steady\par \par

## 2022-06-28 NOTE — CONSULT LETTER
[Dear  ___] : Dear  [unfilled], [Consult Letter:] : I had the pleasure of evaluating your patient, [unfilled]. [Please see my note below.] : Please see my note below. [Consult Closing:] : Thank you very much for allowing me to participate in the care of this patient.  If you have any questions, please do not hesitate to contact me. [FreeTextEntry2] : Deirdre Galvan [FreeTextEntry3] : Sincerely,\par \par \par Morena Garza MD\par Diplomate, American Academy of Psychiatry and Neurology\par Board Certified in the Subspecialty of Clinical Neurophysiology\par Board Certified in the Subspecialty of Sleep Medicine\par Board Certified in the Subspecialty of Epilepsy\par

## 2022-08-09 ENCOUNTER — APPOINTMENT (OUTPATIENT)
Dept: FAMILY MEDICINE | Facility: CLINIC | Age: 57
End: 2022-08-09

## 2022-08-09 VITALS
RESPIRATION RATE: 12 BRPM | SYSTOLIC BLOOD PRESSURE: 110 MMHG | DIASTOLIC BLOOD PRESSURE: 60 MMHG | HEART RATE: 74 BPM | HEIGHT: 72 IN | WEIGHT: 160 LBS | OXYGEN SATURATION: 96 % | TEMPERATURE: 97.2 F | BODY MASS INDEX: 21.67 KG/M2

## 2022-08-09 DIAGNOSIS — F41.8 OTHER SPECIFIED ANXIETY DISORDERS: ICD-10-CM

## 2022-08-09 DIAGNOSIS — Z79.899 OTHER LONG TERM (CURRENT) DRUG THERAPY: ICD-10-CM

## 2022-08-09 PROCEDURE — 99214 OFFICE O/P EST MOD 30 MIN: CPT

## 2022-08-09 RX ORDER — METHOCARBAMOL 500 MG/1
500 TABLET, FILM COATED ORAL
Qty: 21 | Refills: 0 | Status: COMPLETED | COMMUNITY
Start: 2022-03-14

## 2022-08-09 NOTE — HEALTH RISK ASSESSMENT
[Never] : Never [No] : In the past 12 months have you used drugs other than those required for medical reasons? No [Audit-CScore] : 0

## 2022-08-09 NOTE — PLAN
[FreeTextEntry1] : -discussed medical history \par -patient advised that he should follow up with psychiatry, states that he has been off medications for years - information for psychiatrist patient was previously seeing provided along with a list of local psychiatrists and referral  \par -discussed therapy and speaking with behavioral health therapist at [facility] location, patient declined \par -he will follow-up for CPE

## 2022-08-09 NOTE — HISTORY OF PRESENT ILLNESS
[FreeTextEntry1] : discuss medications  [de-identified] : 58 yo male PMH Autism presenting today to discuss medications. He was previously following with SAVANNAH Galvan. He states that he would like to be on clonazepam. When asked what the medication was for, states that he was previously on the medication years ago. He previously did see a psychiatrist (Dr. Chelo Enriquez), he last saw him a few years. In terms of anxiety, he states that he may feel anxiety about once a week, does not contribute anxiety to any particular situation. Denies depression, thoughts of harming himself or others. He states that he was given Lexapro by SAVANNAH Galvan but felt the medication made him tiered and so he discontinued the medication. \par Of note he states that he has a rheumatology appointment today at 3 pm for joint pain.  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

## 2022-08-09 NOTE — PHYSICAL EXAM
[Normal] : soft, non-tender, non-distended, no masses palpated, no HSM and normal bowel sounds [No Spinal Tenderness] : no spinal tenderness [No Joint Swelling] : no joint swelling [Speech Grossly Normal] : speech grossly normal

## 2022-08-12 ENCOUNTER — OUTPATIENT (OUTPATIENT)
Dept: OUTPATIENT SERVICES | Facility: HOSPITAL | Age: 57
LOS: 1 days | End: 2022-08-12

## 2022-08-12 ENCOUNTER — APPOINTMENT (OUTPATIENT)
Dept: MRI IMAGING | Facility: CLINIC | Age: 57
End: 2022-08-12

## 2022-08-12 DIAGNOSIS — M77.32 CALCANEAL SPUR, LEFT FOOT: Chronic | ICD-10-CM

## 2022-08-12 DIAGNOSIS — Z98.890 OTHER SPECIFIED POSTPROCEDURAL STATES: Chronic | ICD-10-CM

## 2022-08-12 DIAGNOSIS — S02.609A FRACTURE OF MANDIBLE, UNSPECIFIED, INITIAL ENCOUNTER FOR CLOSED FRACTURE: Chronic | ICD-10-CM

## 2022-08-12 DIAGNOSIS — Z96.0 PRESENCE OF UROGENITAL IMPLANTS: Chronic | ICD-10-CM

## 2022-08-12 DIAGNOSIS — Z00.8 ENCOUNTER FOR OTHER GENERAL EXAMINATION: ICD-10-CM

## 2022-08-15 ENCOUNTER — OUTPATIENT (OUTPATIENT)
Dept: OUTPATIENT SERVICES | Facility: HOSPITAL | Age: 57
LOS: 1 days | End: 2022-08-15
Payer: MEDICARE

## 2022-08-15 ENCOUNTER — APPOINTMENT (OUTPATIENT)
Dept: RADIOLOGY | Facility: CLINIC | Age: 57
End: 2022-08-15

## 2022-08-15 DIAGNOSIS — Z00.8 ENCOUNTER FOR OTHER GENERAL EXAMINATION: ICD-10-CM

## 2022-08-15 DIAGNOSIS — Z96.0 PRESENCE OF UROGENITAL IMPLANTS: Chronic | ICD-10-CM

## 2022-08-15 DIAGNOSIS — S02.609A FRACTURE OF MANDIBLE, UNSPECIFIED, INITIAL ENCOUNTER FOR CLOSED FRACTURE: Chronic | ICD-10-CM

## 2022-08-15 DIAGNOSIS — M77.32 CALCANEAL SPUR, LEFT FOOT: Chronic | ICD-10-CM

## 2022-08-15 DIAGNOSIS — Z98.890 OTHER SPECIFIED POSTPROCEDURAL STATES: Chronic | ICD-10-CM

## 2022-08-15 PROCEDURE — 73560 X-RAY EXAM OF KNEE 1 OR 2: CPT | Mod: 26,50

## 2022-08-15 PROCEDURE — 73560 X-RAY EXAM OF KNEE 1 OR 2: CPT

## 2022-09-27 NOTE — ED ADULT NURSE NOTE - DATE OF LAST VACCINATION
Presents from home. Reports mechanical fall in bathroom. Reports left hip pain with movement. Denies pain at rest.       
01-Feb-2021

## 2022-12-08 NOTE — ED ADULT TRIAGE NOTE - TEMPERATURE IN FAHRENHEIT (DEGREES F)
Patient notified of test results and Dr Irvin's recommendations. Patient verbalized understanding and agreeable.     97.7

## 2022-12-19 ENCOUNTER — APPOINTMENT (OUTPATIENT)
Dept: FAMILY MEDICINE | Facility: CLINIC | Age: 57
End: 2022-12-19

## 2022-12-19 PROCEDURE — 99213 OFFICE O/P EST LOW 20 MIN: CPT | Mod: CS,95

## 2022-12-19 NOTE — PLAN
[FreeTextEntry1] : -deferred Paxlovid \par -discussed if worsening symptoms such as SOB/chest pain to be seen in the ER \par -discussed symptomatic treatment \par

## 2022-12-19 NOTE — HISTORY OF PRESENT ILLNESS
[Home] : at home, [unfilled] , at the time of the visit. [Medical Office: ([facility])___] : at the medical office located in  [Verbal consent obtained from patient] : the patient, [unfilled] [FreeTextEntry8] : 56 yo male PMH Autism presenting today via telemedicine for COVID. \par He tested positive on 12/18. \par His symptoms started on 12/17 or on 12/18. \par His symptoms include sore throat, rhinorrhea, cough, slight lightheaded sensation. \par Denies SOB, chest pain, fevers, chills.  \par He states that he is vaccinated and he has had 2 or 3 booster vaccines as well, last being in November. .

## 2022-12-19 NOTE — HEALTH RISK ASSESSMENT
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

## 2022-12-19 NOTE — REVIEW OF SYSTEMS
[Fever] : no fever [Chills] : no chills [Shortness Of Breath] : no shortness of breath [Cough] : cough [Negative] : Cardiovascular

## 2023-01-06 ENCOUNTER — NON-APPOINTMENT (OUTPATIENT)
Age: 58
End: 2023-01-06

## 2023-01-11 ENCOUNTER — APPOINTMENT (OUTPATIENT)
Dept: FAMILY MEDICINE | Facility: CLINIC | Age: 58
End: 2023-01-11
Payer: MEDICARE

## 2023-01-11 ENCOUNTER — NON-APPOINTMENT (OUTPATIENT)
Age: 58
End: 2023-01-11

## 2023-01-11 VITALS
HEIGHT: 72 IN | HEART RATE: 72 BPM | SYSTOLIC BLOOD PRESSURE: 100 MMHG | RESPIRATION RATE: 14 BRPM | WEIGHT: 160 LBS | TEMPERATURE: 96.9 F | OXYGEN SATURATION: 97 % | DIASTOLIC BLOOD PRESSURE: 70 MMHG | BODY MASS INDEX: 21.67 KG/M2

## 2023-01-11 DIAGNOSIS — U07.1 COVID-19: ICD-10-CM

## 2023-01-11 PROCEDURE — 99213 OFFICE O/P EST LOW 20 MIN: CPT | Mod: CS

## 2023-01-11 NOTE — REVIEW OF SYSTEMS
[Shortness Of Breath] : no shortness of breath [Wheezing] : no wheezing [Cough] : cough [Dyspnea on Exertion] : not dyspnea on exertion [Headache] : no headache [Negative] : Cardiovascular [de-identified] : lightheaded

## 2023-01-11 NOTE — HISTORY OF PRESENT ILLNESS
[FreeTextEntry1] : cough  [de-identified] : 58 yo male PMH Autism presenting today for ongoing cough since having COVID. His symptoms initially started on 12/17. \par Overall he is doing better, he still has a lingering cough. Denies SOB, wheezing, fevers, chills.  \par He also mentions  symptoms of at times feeling lightheaded is still present but improving. Denies room spinning sensation, headache, changes in vision or any other neurological symptoms.

## 2023-01-11 NOTE — PLAN
[FreeTextEntry1] : #COVID follow-up \par -post viral cough  - continue with symptomatic treatment (patient states he is using cough medication other than benzonatate from urgent care) \par -will call if symptoms do not improve or worsen \par -symptoms of feeling lightheaded has improved - not currently associated with any other symptoms, will continue to monitor and call if symptoms do not continue to improve/resolve or worsen, discussed can consider neuro follow-up  \par -follow-up in a few weeks for CPE or sooner if needed \par

## 2023-01-11 NOTE — PHYSICAL EXAM
[Normal] : normal rate, regular rhythm, normal S1 and S2 and no murmur heard [Coordination Grossly Intact] : coordination grossly intact [No Focal Deficits] : no focal deficits [Normal Gait] : normal gait

## 2023-02-13 ENCOUNTER — NON-APPOINTMENT (OUTPATIENT)
Age: 58
End: 2023-02-13

## 2023-02-13 ENCOUNTER — APPOINTMENT (OUTPATIENT)
Dept: FAMILY MEDICINE | Facility: CLINIC | Age: 58
End: 2023-02-13
Payer: MEDICARE

## 2023-02-13 VITALS
WEIGHT: 160 LBS | SYSTOLIC BLOOD PRESSURE: 110 MMHG | BODY MASS INDEX: 21.67 KG/M2 | HEIGHT: 72 IN | TEMPERATURE: 99.5 F | OXYGEN SATURATION: 98 % | RESPIRATION RATE: 14 BRPM | DIASTOLIC BLOOD PRESSURE: 79 MMHG | HEART RATE: 74 BPM

## 2023-02-13 DIAGNOSIS — Z13.29 ENCOUNTER FOR SCREENING FOR OTHER SUSPECTED ENDOCRINE DISORDER: ICD-10-CM

## 2023-02-13 DIAGNOSIS — H61.20 IMPACTED CERUMEN, UNSPECIFIED EAR: ICD-10-CM

## 2023-02-13 DIAGNOSIS — Z23 ENCOUNTER FOR IMMUNIZATION: ICD-10-CM

## 2023-02-13 DIAGNOSIS — Z00.00 ENCOUNTER FOR GENERAL ADULT MEDICAL EXAMINATION W/OUT ABNORMAL FINDINGS: ICD-10-CM

## 2023-02-13 DIAGNOSIS — Z13.1 ENCOUNTER FOR SCREENING FOR DIABETES MELLITUS: ICD-10-CM

## 2023-02-13 DIAGNOSIS — Z13.220 ENCOUNTER FOR SCREENING FOR LIPOID DISORDERS: ICD-10-CM

## 2023-02-13 DIAGNOSIS — Z12.5 ENCOUNTER FOR SCREENING FOR MALIGNANT NEOPLASM OF PROSTATE: ICD-10-CM

## 2023-02-13 PROCEDURE — 90471 IMMUNIZATION ADMIN: CPT

## 2023-02-13 PROCEDURE — G0439: CPT

## 2023-02-13 PROCEDURE — 90715 TDAP VACCINE 7 YRS/> IM: CPT | Mod: GY

## 2023-02-13 RX ORDER — BENZONATATE 100 MG/1
100 CAPSULE ORAL TWICE DAILY
Qty: 28 | Refills: 0 | Status: COMPLETED | COMMUNITY
Start: 2022-12-19 | End: 2023-02-13

## 2023-02-13 NOTE — HEALTH RISK ASSESSMENT
[Alone] : lives alone [Never] : Never [No] : In the past 12 months have you used drugs other than those required for medical reasons? No [0] : 2) Feeling down, depressed, or hopeless: Not at all (0) [PHQ-2 Negative - No further assessment needed] : PHQ-2 Negative - No further assessment needed [HIV test declined] : HIV test declined [Hepatitis C test offered] : Hepatitis C test offered [Employed] : employed [Single] : single [Fully functional (bathing, dressing, toileting, transferring, walking, feeding)] : Fully functional (bathing, dressing, toileting, transferring, walking, feeding) [Fully functional (using the telephone, shopping, preparing meals, housekeeping, doing laundry, using] : Fully functional and needs no help or supervision to perform IADLs (using the telephone, shopping, preparing meals, housekeeping, doing laundry, using transportation, managing medications and managing finances) [Audit-CScore] : 0 [DCA6Inxla] : 0 [ColonoscopyComments] : follows w/ GI [de-identified] : mother [place], siblings other states, has friend in [place] [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

## 2023-02-13 NOTE — PHYSICAL EXAM
[Normal] : normal rate, regular rhythm, normal S1 and S2 and no murmur heard [Soft] : abdomen soft [Non Tender] : non-tender [Non-distended] : non-distended [Coordination Grossly Intact] : coordination grossly intact [No Focal Deficits] : no focal deficits [de-identified] : R ear cerumen  impaction

## 2023-02-13 NOTE — HISTORY OF PRESENT ILLNESS
[FreeTextEntry1] : annual  [de-identified] : 56 yo male PMH Autism presenting today for CPE.  Denies any complaints today, doing well, states cough/dizziness since COVID have resolved.  \par States he follows w/ ortho for his shoulder. \par

## 2023-02-13 NOTE — PLAN
[FreeTextEntry1] : #HCM \par -lab work script provided \par -vaccinations: \par     COVID: vaccinated, he will bring records next time \par     Influenza; Up to date \par     Shingles - reports up to date \par     TDAP: given at today's visit \par -depression screen negative \par -He follows w/ GI in [place] for coloscopy - believes he may be due, he will follow-up and have records sent to our office \par  -Derm for skin check  \par -discussed follow-up with his psychiatrist

## 2023-02-17 LAB
25(OH)D3 SERPL-MCNC: 38.9 NG/ML
ALBUMIN SERPL ELPH-MCNC: 4.3 G/DL
ALP BLD-CCNC: 82 U/L
ALT SERPL-CCNC: 19 U/L
ANION GAP SERPL CALC-SCNC: 12 MMOL/L
AST SERPL-CCNC: 23 U/L
BASOPHILS # BLD AUTO: 0.04 K/UL
BASOPHILS NFR BLD AUTO: 0.9 %
BILIRUB SERPL-MCNC: 0.6 MG/DL
BUN SERPL-MCNC: 18 MG/DL
CALCIUM SERPL-MCNC: 9.7 MG/DL
CHLORIDE SERPL-SCNC: 107 MMOL/L
CHOLEST SERPL-MCNC: 148 MG/DL
CO2 SERPL-SCNC: 23 MMOL/L
CREAT SERPL-MCNC: 0.88 MG/DL
EGFR: 101 ML/MIN/1.73M2
EOSINOPHIL # BLD AUTO: 0.11 K/UL
EOSINOPHIL NFR BLD AUTO: 2.4 %
ESTIMATED AVERAGE GLUCOSE: 103 MG/DL
GLUCOSE SERPL-MCNC: 92 MG/DL
HBA1C MFR BLD HPLC: 5.2 %
HCT VFR BLD CALC: 38.9 %
HCV AB SER QL: NONREACTIVE
HCV S/CO RATIO: 0.13 S/CO
HDLC SERPL-MCNC: 46 MG/DL
HGB BLD-MCNC: 13.8 G/DL
IMM GRANULOCYTES NFR BLD AUTO: 0.2 %
LDLC SERPL CALC-MCNC: 89 MG/DL
LYMPHOCYTES # BLD AUTO: 1.33 K/UL
LYMPHOCYTES NFR BLD AUTO: 28.9 %
MAN DIFF?: NORMAL
MCHC RBC-ENTMCNC: 29.9 PG
MCHC RBC-ENTMCNC: 35.5 GM/DL
MCV RBC AUTO: 84.4 FL
MONOCYTES # BLD AUTO: 0.32 K/UL
MONOCYTES NFR BLD AUTO: 6.9 %
NEUTROPHILS # BLD AUTO: 2.8 K/UL
NEUTROPHILS NFR BLD AUTO: 60.7 %
NONHDLC SERPL-MCNC: 102 MG/DL
PLATELET # BLD AUTO: 143 K/UL
POTASSIUM SERPL-SCNC: 4.1 MMOL/L
PROT SERPL-MCNC: 6.5 G/DL
PSA FREE FLD-MCNC: 38 %
PSA FREE SERPL-MCNC: 0.33 NG/ML
PSA SERPL-MCNC: 0.88 NG/ML
RBC # BLD: 4.61 M/UL
RBC # FLD: 12.5 %
SODIUM SERPL-SCNC: 142 MMOL/L
TRIGL SERPL-MCNC: 65 MG/DL
TSH SERPL-ACNC: 1.75 UIU/ML
WBC # FLD AUTO: 4.61 K/UL

## 2023-02-19 ENCOUNTER — EMERGENCY (EMERGENCY)
Facility: HOSPITAL | Age: 58
LOS: 1 days | Discharge: HOME | End: 2023-02-19
Attending: EMERGENCY MEDICINE | Admitting: EMERGENCY MEDICINE
Payer: MEDICARE

## 2023-02-19 VITALS
HEIGHT: 72 IN | OXYGEN SATURATION: 98 % | RESPIRATION RATE: 19 BRPM | HEART RATE: 67 BPM | TEMPERATURE: 98 F | SYSTOLIC BLOOD PRESSURE: 104 MMHG | WEIGHT: 160.06 LBS | DIASTOLIC BLOOD PRESSURE: 70 MMHG

## 2023-02-19 DIAGNOSIS — Z96.0 PRESENCE OF UROGENITAL IMPLANTS: Chronic | ICD-10-CM

## 2023-02-19 DIAGNOSIS — S02.609A FRACTURE OF MANDIBLE, UNSPECIFIED, INITIAL ENCOUNTER FOR CLOSED FRACTURE: Chronic | ICD-10-CM

## 2023-02-19 DIAGNOSIS — Z98.890 OTHER SPECIFIED POSTPROCEDURAL STATES: Chronic | ICD-10-CM

## 2023-02-19 DIAGNOSIS — M77.32 CALCANEAL SPUR, LEFT FOOT: Chronic | ICD-10-CM

## 2023-02-19 PROCEDURE — 99283 EMERGENCY DEPT VISIT LOW MDM: CPT

## 2023-02-19 RX ADMIN — Medication 1 DROP(S): at 02:18

## 2023-02-19 NOTE — ED PROVIDER NOTE - PATIENT PORTAL LINK FT
You can access the FollowMyHealth Patient Portal offered by [facility] by registering at the following website: [email or web address]. By joining [employer]’s FollowMyHealth portal, you will also be able to view your health information using other applications (apps) compatible with our system.

## 2023-02-19 NOTE — ED ADULT TRIAGE NOTE - CHIEF COMPLAINT QUOTE
patient presents to Ed with complaint of right eye swelling x 1 hour associated with redness. Pain 4/10 on pain scale. Alert and oriented x 4. patient denies nausea, vomiting, dizziness or SOB.

## 2023-02-19 NOTE — ED PROVIDER NOTE - CLINICAL SUMMARY MEDICAL DECISION MAKING FREE TEXT BOX
patient evaluated using slit lamp after placement of tetracaine and fluorescein.  The patient's history and physical is most consistent with episcleritis.  The patient does not have any history of autoimmune disease.  Patient to treat discomfort with NSAIDs and follow-up with his optometrist/ophthalmologist

## 2023-02-19 NOTE — ED PROVIDER NOTE - OBJECTIVE STATEMENT
57-year-old male with history listed below presents with complaints of right eye swelling, redness and foreign body sensation that started approximately 1 hour prior to arrival.  The patient notes that he wears contacts but was not wearing contacts at the onset of the discomfort and is currently wearing glasses.  The patient notes that the pain is approximately 4 out of 10.  Patient denies any exposure to chemicals or welding.  He denies any fever or other constitutional symptoms.

## 2023-02-19 NOTE — ED PROVIDER NOTE - NSFOLLOWUPINSTRUCTIONS_ED_ALL_ED_FT
Scleritis and Episcleritis    Scleritis and episcleritis are redness and inflammation of the white part of the eye (sclera). Both of these conditions may affect one or both eyes.    Episcleritis involves only the surface of the sclera. Episcleritis is a mild condition that does not affect your vision. It usually clears up in a few days without treatment.  Scleritis involves the body of the sclera. It is a serious condition that may cause severe pain and some vision loss. Scleritis often needs to be treated with strong medicines.    These two conditions may occur along with other diseases that cause inflammation, including diseases in which your body's defense system (immune system) mistakenly attacks normal body tissues (autoimmune diseases). Rheumatoid arthritis is one common autoimmune disease that may cause episcleritis or scleritis. Other diseases that may cause these conditions include Crohn's disease, lupus, and inflammatory bowel disease.    What are the causes?  Scleritis is usually caused by:    An autoimmune disease.  Infection.    Episcleritis may be caused by:    Eye infections.  Eye irritations.    In some cases, the cause of these conditions is not known.    What increases the risk?  You are more likely to develop these conditions if you:    Are a woman.  Are 20–50 years old.  Have an autoimmune disease, especially rheumatoid arthritis.    What are the signs or symptoms?     Symptoms of episcleritis include:    Redness of the sclera. This may go away after several days.  Mild pain.    Symptoms of scleritis include:    Redness of the sclera.  Moderate to severe pain.  Pain that gets much worse with light exposure (photophobia).  Tearing.  Soreness when touched (tenderness).  Vision problems. This can range from mild blurred vision to severe vision loss.    How is this diagnosed?  These conditions may be diagnosed based on your symptoms, your medical history, and a physical exam. You may need to see a health care provider who specializes in diseases and conditions of the eye (ophthalmologist). An ophthalmologist may:    Put drops in your eye.  Examine your eye with a certain type of light (slit lamp).  Order blood tests or refer you to a specialist to check for autoimmune disease.    How is this treated?  Episcleritis often gets better without treatment after several days. If treatment is needed, it may include:    Eye drops.  Cold compresses.  NSAIDs, such as ibuprofen, to reduce discomfort and swelling.    Scleritis is a serious condition that needs to be treated. Treatment may include:    Eye drops.  NSAIDs.  Prescription pain medicines.  Steroid medicines.  Medicines that suppress the immune system.  Treatment of an underlying autoimmune disease.  Eye surgery. This is rare.    Follow these instructions at home:      Medicines    Use eye drops only as told by your health care provider.  Take over-the-counter and prescription medicines only as told by your health care provider.        General instructions    If you usually wear contact lenses, do not wear them until your health care provider says that you can.  If directed, use cold compresses over your eye or eyes to help relieve pain.  Return to your normal activities as told by your health care provider. Ask your health care provider what activities are safe for you.  Keep all follow-up visits as told by your health care provider. This is important.    Contact a health care provider if:  Your symptoms have not gotten better in the expected amount of time. This can take days to weeks.  You develop new symptoms.  You develop worsening pain, photophobia, or blurred vision.  Your symptoms come back after treatment.    Get help right away if you have:  Severe pain or severe photophobia.  Sudden vision loss.    Summary  Scleritis and episcleritis are redness and inflammation of the white part of the eye (sclera).  Episcleritis involves the surface of the sclera while scleritis involves the entire sclera.  These two conditions may occur along with other diseases that cause inflammation.  The conditions are treated with eye drops, cold compresses, and oral medicines to relieve the inflammation.    ADDITIONAL NOTES AND INSTRUCTIONS    Please follow up with your Primary MD in 24-48 hr.  Seek immediate medical care for any new/worsening signs or symptoms.

## 2023-03-09 ENCOUNTER — APPOINTMENT (OUTPATIENT)
Dept: OTOLARYNGOLOGY | Facility: CLINIC | Age: 58
End: 2023-03-09
Payer: MEDICARE

## 2023-03-09 VITALS
TEMPERATURE: 97.2 F | HEART RATE: 68 BPM | WEIGHT: 160 LBS | DIASTOLIC BLOOD PRESSURE: 63 MMHG | SYSTOLIC BLOOD PRESSURE: 99 MMHG | HEIGHT: 72 IN | BODY MASS INDEX: 21.67 KG/M2

## 2023-03-09 DIAGNOSIS — H61.23 IMPACTED CERUMEN, BILATERAL: ICD-10-CM

## 2023-03-09 DIAGNOSIS — H93.8X3 OTHER SPECIFIED DISORDERS OF EAR, BILATERAL: ICD-10-CM

## 2023-03-09 DIAGNOSIS — J34.2 DEVIATED NASAL SEPTUM: ICD-10-CM

## 2023-03-09 PROCEDURE — 99204 OFFICE O/P NEW MOD 45 MIN: CPT | Mod: 25

## 2023-03-09 PROCEDURE — 31231 NASAL ENDOSCOPY DX: CPT

## 2023-03-09 PROCEDURE — 69210 REMOVE IMPACTED EAR WAX UNI: CPT

## 2023-03-09 NOTE — CONSULT LETTER
[Dear  ___] : Dear  [unfilled], [Consult Letter:] : I had the pleasure of evaluating your patient, [unfilled]. [Please see my note below.] : Please see my note below. [Consult Closing:] : Thank you very much for allowing me to participate in the care of this patient.  If you have any questions, please do not hesitate to contact me. [Sincerely,] : Sincerely, [FreeTextEntry3] : Cem Wright MD\par [facility]\par Otolaryngology and Facial Plastics\par Associated Professor, [employer]\par

## 2023-03-09 NOTE — END OF VISIT
[FreeTextEntry3] : I, Dr. Wright personally performed the evaluation and management (E/M) services including all necessary procedures, for this new patient. That E/M includes conducting the clinically appropriate initial history &/or exam, assessing all conditions, and establishing the plan of care. Today, my CONCETTA, Enedina Mckeon, was here to observe &/or participate in the visit & follow plan of care established by me.\par \par \par

## 2023-03-09 NOTE — ASSESSMENT
[FreeTextEntry1] : Patient referred here by primary care physician because of cerumen impaction massive cerumen curetted out bilaterally patient also somewhat congested his nose endoscopically has a deviated septum with significant compromise in the right nasal cavity rest of his examination is normal we will follow-up with us as needed.

## 2023-03-09 NOTE — HISTORY OF PRESENT ILLNESS
[de-identified] : Patient is here today with wax in the right ear as per his PCP. He does not have any issues with pain in the ears, ringing in the ears or dizziness \par He denies issues with hearing. \par he does not have any nasal complaints today like congestion or runny nose

## 2023-03-10 RX ORDER — CRANBERRY FRUIT EXTRACT 650 MG
CAPSULE ORAL
Refills: 0 | Status: ACTIVE | COMMUNITY
Start: 2023-03-10

## 2023-06-06 ENCOUNTER — EMERGENCY (EMERGENCY)
Facility: HOSPITAL | Age: 58
LOS: 1 days | Discharge: HOME | End: 2023-06-06
Attending: EMERGENCY MEDICINE | Admitting: EMERGENCY MEDICINE
Payer: MEDICARE

## 2023-06-06 VITALS
HEIGHT: 72 IN | WEIGHT: 160.06 LBS | TEMPERATURE: 97 F | HEART RATE: 71 BPM | DIASTOLIC BLOOD PRESSURE: 65 MMHG | RESPIRATION RATE: 19 BRPM | SYSTOLIC BLOOD PRESSURE: 103 MMHG | OXYGEN SATURATION: 97 %

## 2023-06-06 DIAGNOSIS — Z96.0 PRESENCE OF UROGENITAL IMPLANTS: Chronic | ICD-10-CM

## 2023-06-06 DIAGNOSIS — M77.32 CALCANEAL SPUR, LEFT FOOT: Chronic | ICD-10-CM

## 2023-06-06 DIAGNOSIS — S02.609A FRACTURE OF MANDIBLE, UNSPECIFIED, INITIAL ENCOUNTER FOR CLOSED FRACTURE: Chronic | ICD-10-CM

## 2023-06-06 DIAGNOSIS — Z98.890 OTHER SPECIFIED POSTPROCEDURAL STATES: Chronic | ICD-10-CM

## 2023-06-06 PROCEDURE — 99283 EMERGENCY DEPT VISIT LOW MDM: CPT

## 2023-06-06 RX ORDER — IBUPROFEN 200 MG
600 TABLET ORAL ONCE
Refills: 0 | Status: COMPLETED | OUTPATIENT
Start: 2023-06-06 | End: 2023-06-06

## 2023-06-06 RX ORDER — NEOMYCIN/POLYMYXN B/GRAMICIDIN
2 DROPS OPHTHALMIC (EYE)
Qty: 10 | Refills: 0
Start: 2023-06-06 | End: 2023-06-12

## 2023-06-06 RX ORDER — NEOMYCIN/POLYMYXIN B/DEXAMETHA 0.1 %
2 SUSPENSION, DROPS(FINAL DOSAGE FORM)(ML) OPHTHALMIC (EYE) ONCE
Refills: 0 | Status: COMPLETED | OUTPATIENT
Start: 2023-06-06 | End: 2023-06-06

## 2023-06-06 RX ADMIN — Medication 600 MILLIGRAM(S): at 04:30

## 2023-06-06 RX ADMIN — Medication 2 DROP(S): at 04:30

## 2023-06-06 RX ADMIN — Medication 1 DROP(S): at 04:39

## 2023-06-06 NOTE — ED PROVIDER NOTE - PHYSICAL EXAMINATION
exam:   General: well appearing, NAD.   HEENT: nose normal, OP no erythema/exudate/swelling.   Eyes: Right eye normal.  Left eye with moderate conjunctival erythema/injection.  No chemosis.  Anterior chamber clear.  PERRL.  EOMI.  Fluorescein exam unremarkable, no abrasion or ulcer.  Intraocular pressure 8 both eyes  cor: RRR, s1s2, 2+rad pulses.   lungs: ctabl, no resp distress.   neuro: a&ox3, cn2-12 intact, JIANG, 5/5 strength c nl sensation all extremities, nl coordination.   MSK: no extremity swelling.  Skin: normal, no rash

## 2023-06-06 NOTE — ED PROVIDER NOTE - CARE PROVIDER_API CALL
Olegario Lundberg.  Ophthalmology  [address] [place] [address], [address]  [place] [address]  Phone: [phone]  Fax: [phone]  Follow Up Time: 1-3 Days

## 2023-06-06 NOTE — ED PROVIDER NOTE - PATIENT PORTAL LINK FT
You can access the FollowMyHealth Patient Portal offered by [facility] by registering at the following website: [email or web address]. By joining [employer]’s FollowMyHealth portal, you will also be able to view your health information using other applications (apps) compatible with our system.

## 2023-06-06 NOTE — ED ADULT NURSE NOTE - NSFALLUNIVINTERV_ED_ALL_ED
Bed/Stretcher in lowest position, wheels locked, appropriate side rails in place/Call bell, personal items and telephone in reach/Instruct patient to call for assistance before getting out of bed/chair/stretcher/Non-slip footwear applied when patient is off stretcher/[place] to call system/Physically safe environment - no spills, clutter or unnecessary equipment/Purposeful proactive rounding/Room/bathroom lighting operational, light cord in reach

## 2023-06-06 NOTE — ED ADULT NURSE NOTE - OBJECTIVE STATEMENT
Patient presents to ED with complaint of left eye burning pain for the last 3 to 4 hours associated with some redness.  No discharge.  No blurry vision.  No trauma or foreign body in eye reported.  Does not wear contacts. Alert or oriented x 4. Patient presents to ED with complaint of left eye burning pain for the last 3 to 4 hours associated with some redness.  No discharge.  No blurry vision.  No trauma or foreign body in eye reported.  Does not wear contacts. Alert or oriented x 4.   Visual acuity 20/25 bilateral eyes. No change or difference in vision.

## 2023-06-06 NOTE — ED PROVIDER NOTE - NSFOLLOWUPINSTRUCTIONS_ED_ALL_ED_FT
-Use neomycin polymyxin granisetron eyedrops, 2 drops to left eye every 4 hours while awake for the next 7 days  -Take ibuprofen/Motrin as needed for pain  -See ophthalmologist in the next 1 to 2 days  -Return to any ER for worsening pain or vision or other concerns    Conjunctivitis    WHAT YOU NEED TO KNOW:    Conjunctivitis, or pink eye, is inflammation of your conjunctiva. The conjunctiva is a thin tissue that covers the front of your eye and the back of your eyelids. The conjunctiva helps protect your eye and keep it moist. Conjunctivitis may be caused by bacteria, allergies, or a virus. If your conjunctivitis is caused by bacteria, it may get better on its own in about 7 days. Viral conjunctivitis can last up to 3 weeks.  Conjunctivitis    DISCHARGE INSTRUCTIONS:    Return to the emergency department if:    You have worsening eye pain.    The swelling in your eye gets worse, even after treatment.    Your vision suddenly becomes worse or you cannot see at all.  Call your doctor if:    You develop a fever and ear pain.    You have tiny bumps or spots of blood on your eye.    You have questions or concerns about your condition or care.  Manage your symptoms:    Apply a cool compress. Wet a washcloth with cold water and place it on your eye. This will help decrease itching and irritation.    Do not wear contact lenses. They can irritate your eye. Throw away the pair you are using and ask when you can wear them again. Use a new pair of lenses when your provider says it is okay.    Avoid irritants. Stay away from smoke filled areas. Shield your eyes from wind and sun.    Flush your eye. You may need to flush your eye with saline to help decrease your symptoms. Ask for more information on how to flush your eye.  Medicines: Treatment depends on what is causing your conjunctivitis. You maybe given any of the following:    Allergy medicine helps decrease itchy, red, swollen eyes caused by allergies. It may be given as a pill, eye drops, or nasal spray.    Antibiotics may be needed if your conjunctivitis is caused by bacteria. This medicine may be given as a pill, eye drops, or eye ointment.    Take your medicine as directed. Contact your healthcare provider if you think your medicine is not helping or if you have side effects. Tell your provider if you are allergic to any medicine. Keep a list of the medicines, vitamins, and herbs you take. Include the amounts, and when and why you take them. Bring the list or the pill bottles to follow-up visits. Carry your medicine list with you in case of an emergency.  Prevent the spread of conjunctivitis:    Wash your hands with soap and water often. Wash your hands before and after you touch your eyes. Also wash your hands before you prepare or eat food and after you use the bathroom or change a diaper.  Handwashing      Avoid allergens. Try to avoid the things that cause your allergies, such as pets, dust, or grass.    Avoid contact with others. Do not share towels or washcloths. Try to stay away from others as much as possible. Ask when you can return to work or school.    Throw away eye makeup. The bacteria that caused your conjunctivitis can stay in eye makeup. Throw away your current mascara and other eye makeup. Never share mascara or other eye makeup with anyone.  Follow up with your doctor as directed: Write down your questions so you remember to ask them during your visits.

## 2023-06-06 NOTE — ED PROVIDER NOTE - CLINICAL SUMMARY MEDICAL DECISION MAKING FREE TEXT BOX
57-year-old male complaining of left eye burning pain for the last 3 to 4 hours associated with some redness.  No discharge.  No blurry vision.  No trauma or foreign body in eye reported.  Does not wear contacts.  Wears glasses for distance and reading.  No fever chills headache nausea vomiting    Patient with conjunctival injection of left eye on exam, no abrasions.  Normal intraocular pressure.  Vision normal.  Likely conjunctivitis.  Will treat with Neosporin drops, Motrin.  Follow-up ophthalmology.

## 2023-06-06 NOTE — ED PROVIDER NOTE - OBJECTIVE STATEMENT
57-year-old male complaining of left eye burning pain for the last 3 to 4 hours associated with some redness.  No discharge.  No blurry vision.  No trauma or foreign body in eye reported.  Does not wear contacts.  Wears glasses for distance and reading.  No fever chills headache nausea vomiting

## 2023-06-06 NOTE — ED ADULT TRIAGE NOTE - CHIEF COMPLAINT QUOTE
PAtient presents to ED with complaint of bilateral eye redness, x several hours. Alert and oriented x 4.

## 2023-06-06 NOTE — ED PROVIDER NOTE - NSFOLLOWUPCLINICS_GEN_ALL_ED_FT
[facility] - Ophthalmology  Ophthalmology  [address], [facility] [address]  [place] [address]  Phone: [phone]  Fax:   Follow Up Time: 1-3 Days

## 2023-07-19 ENCOUNTER — APPOINTMENT (OUTPATIENT)
Dept: FAMILY MEDICINE | Facility: CLINIC | Age: 58
End: 2023-07-19
Payer: MEDICARE

## 2023-07-19 VITALS
OXYGEN SATURATION: 97 % | DIASTOLIC BLOOD PRESSURE: 62 MMHG | SYSTOLIC BLOOD PRESSURE: 90 MMHG | RESPIRATION RATE: 12 BRPM | HEIGHT: 72 IN | BODY MASS INDEX: 21.26 KG/M2 | HEART RATE: 65 BPM | TEMPERATURE: 97.5 F | WEIGHT: 157 LBS

## 2023-07-19 DIAGNOSIS — F32.A DEPRESSION, UNSPECIFIED: ICD-10-CM

## 2023-07-19 DIAGNOSIS — F41.9 ANXIETY DISORDER, UNSPECIFIED: ICD-10-CM

## 2023-07-19 DIAGNOSIS — E86.0 DEHYDRATION: ICD-10-CM

## 2023-07-19 PROCEDURE — 99213 OFFICE O/P EST LOW 20 MIN: CPT

## 2023-07-19 NOTE — PHYSICAL EXAM
[No Acute Distress] : no acute distress [Well Nourished] : well nourished [Well Developed] : well developed [Well-Appearing] : well-appearing [Normal Sclera/Conjunctiva] : normal sclera/conjunctiva [PERRL] : pupils equal round and reactive to light [Normal Oropharynx] : the oropharynx was normal [No Lymphadenopathy] : no lymphadenopathy [Supple] : supple [Thyroid Normal, No Nodules] : the thyroid was normal and there were no nodules present [No Respiratory Distress] : no respiratory distress  [No Accessory Muscle Use] : no accessory muscle use [Clear to Auscultation] : lungs were clear to auscultation bilaterally [Normal] : normal rate, regular rhythm, normal S1 and S2 and no murmur heard [No Edema] : there was no peripheral edema [Normal Supraclavicular Nodes] : no supraclavicular lymphadenopathy [Normal Anterior Cervical Nodes] : no anterior cervical lymphadenopathy [No Rash] : no rash [Alert and Oriented x3] : oriented to person, place, and time [Normal Rhythm] : a normal rhythm [Delusions] : exhibited no delusions [Hallucinations] : exhibited no hallucinations

## 2023-07-19 NOTE — PLAN
[FreeTextEntry1] : # Dehydration\par Patient has been working outdoors in heat wave\par Increase fluid and calorie intake on days he is working outside.\par \par #Anxiety/depression\par Discussed patient with PCP, he has a degree of autism maybe (?)\par Strongly encouraged patient to re-establish care with psychiatrist\par Not reporting SI/HI\par \par f/u with PCP as needed

## 2023-07-19 NOTE — REVIEW OF SYSTEMS
[Fatigue] : fatigue [Anxiety] : anxiety [Depression] : depression [Fever] : no fever [Chills] : no chills [Earache] : no earache [Sore Throat] : no sore throat [Chest Pain] : no chest pain [Palpitations] : no palpitations [Lower Ext Edema] : no lower extremity edema [Shortness Of Breath] : no shortness of breath [Cough] : no cough [Dyspnea on Exertion] : not dyspnea on exertion [Abdominal Pain] : no abdominal pain [Nausea] : no nausea [Constipation] : no constipation [Diarrhea] : no diarrhea [Vomiting] : no vomiting [Dysuria] : no dysuria [FreeTextEntry2] : "dazed" feeling

## 2023-07-19 NOTE — HISTORY OF PRESENT ILLNESS
[FreeTextEntry8] : Pt comes in for headache eval.\par \par Pt has chronic upper body weakness, especially when lying down. He has fatigue and dazed over last few days. He thinks it's partly from working in sun during the day. 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 He wears protective clothing (long sleeve, white, usually wears a hat and sunglasses). He drinks about a gallon of water on these days as well as a bottle of gatorade. Has been taking breaks at work a little more frequently, moving to shady spot more often (but doesn't have access to indoor). Skips regular meals, but has snacks throughout day - cucumbers and apples and oranges.

## 2023-07-27 ENCOUNTER — NON-APPOINTMENT (OUTPATIENT)
Age: 58
End: 2023-07-27

## 2023-08-03 ENCOUNTER — NON-APPOINTMENT (OUTPATIENT)
Age: 58
End: 2023-08-03

## 2023-09-12 ENCOUNTER — APPOINTMENT (OUTPATIENT)
Dept: FAMILY MEDICINE | Facility: CLINIC | Age: 58
End: 2023-09-12

## 2023-09-15 ENCOUNTER — APPOINTMENT (OUTPATIENT)
Dept: MRI IMAGING | Facility: CLINIC | Age: 58
End: 2023-09-15
Payer: MEDICARE

## 2023-09-15 ENCOUNTER — OUTPATIENT (OUTPATIENT)
Dept: OUTPATIENT SERVICES | Facility: HOSPITAL | Age: 58
LOS: 1 days | End: 2023-09-15
Payer: MEDICARE

## 2023-09-15 DIAGNOSIS — M79.641 PAIN IN RIGHT HAND: ICD-10-CM

## 2023-09-15 DIAGNOSIS — S02.609A FRACTURE OF MANDIBLE, UNSPECIFIED, INITIAL ENCOUNTER FOR CLOSED FRACTURE: Chronic | ICD-10-CM

## 2023-09-15 DIAGNOSIS — M77.32 CALCANEAL SPUR, LEFT FOOT: Chronic | ICD-10-CM

## 2023-09-15 DIAGNOSIS — Z96.0 PRESENCE OF UROGENITAL IMPLANTS: Chronic | ICD-10-CM

## 2023-09-15 DIAGNOSIS — Z98.890 OTHER SPECIFIED POSTPROCEDURAL STATES: Chronic | ICD-10-CM

## 2023-09-15 DIAGNOSIS — Z00.8 ENCOUNTER FOR OTHER GENERAL EXAMINATION: ICD-10-CM

## 2023-09-15 PROCEDURE — 73218 MRI UPPER EXTREMITY W/O DYE: CPT | Mod: 26,RT,MH

## 2023-09-15 PROCEDURE — 73218 MRI UPPER EXTREMITY W/O DYE: CPT | Mod: MH

## 2024-01-03 ENCOUNTER — APPOINTMENT (OUTPATIENT)
Dept: ORTHOPEDIC SURGERY | Facility: CLINIC | Age: 59
End: 2024-01-03
Payer: MEDICARE

## 2024-01-03 VITALS — HEIGHT: 72 IN | WEIGHT: 157 LBS | BODY MASS INDEX: 21.26 KG/M2

## 2024-01-03 PROCEDURE — 99214 OFFICE O/P EST MOD 30 MIN: CPT

## 2024-01-03 PROCEDURE — 73564 X-RAY EXAM KNEE 4 OR MORE: CPT | Mod: LT

## 2024-01-03 RX ORDER — DICLOFENAC SODIUM 75 MG/1
75 TABLET, DELAYED RELEASE ORAL
Qty: 60 | Refills: 3 | Status: ACTIVE | COMMUNITY
Start: 2024-01-03 | End: 1900-01-01

## 2024-01-03 NOTE — ADDENDUM
[FreeTextEntry1] : I, GIOVANNA SETH, acted solely as a scribe for Dr. Wan Coelho on this date 01/03/2024.  All medical record entries made by the Scribe were at my, Dr. Wan Coelho, direction and personally dictated by me on 01/03/2024. I have reviewed the chart and agree that the record accurately reflects my personal performance of the history, physical exam, assessment and plan. I have also personally directed, reviewed, and agreed with the chart.

## 2024-01-03 NOTE — DISCUSSION/SUMMARY
[de-identified] : I went over the pathophysiology of the patient's symptoms in great detail with the patient. I discussed the underlying pathophysiology of the patient's condition in great detail with the patient. I went over the patient's x-rays with them in great detail. We are recommending Diclofenac at this time. A prescription was provided. At-home stretching exercises were discussed and demonstrated with the patient. He needs to avoid high-impact activities such as running and jumping or riding a treadmill. I recommend alternative activities such as riding a stationary bike or elliptical on low tension. He should focus on light weight and high repetition exercises. He should avoid squatting and kneeling.   All of their questions were answered. They understand and consent to the plan.   FU in one month. we will consider a left knee cortisone injection upon his next visit.

## 2024-01-03 NOTE — HISTORY OF PRESENT ILLNESS
[de-identified] : 55 year old male presents with left knee and hamstring pain today. He states he felt pain of his left hamstring and knee when he was bending down. He states running is painful as well. He states he was using Voltaren gel. He has difficulty kneeling, squatting, and standing up from a seated position. He is s/p right shoulder arthroscopy done on 10/27/2020.  He presents with a list of questions that is typed and we have prepared to answer them well.  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

## 2024-01-03 NOTE — PHYSICAL EXAM
[UE] : 5/5 motor strength in bilateral upper extremities [de-identified] : Constitutional o Appearance : well-nourished, well developed, alert, in no acute distress  Head and Face o Head :  Inspection : atraumatic, normocephalic o Face :  Inspection : no visible rash or discoloration Respiratory o Respiratory Effort: breathing unlabored  Neurologic o Mental Status Examination :  Orientation : alert and oriented X 3 Psychiatric o Mood and Affect: mood normal, affect appropriate Cardiovascular o Observation/Palpation : - no swelling Lymphatic o Additional Nodes : No palpable lymph nodes present  Right Lower Extremity o Buttock : no tenderness, swelling or deformities  o Right Hip :  Inspection/Palpation : no tenderness, swelling or deformities  Range of Motion : full and painless in all planes, no crepitance  Stability : joint stability intact  Strength : extension, flexion, adduction, abduction, internal rotation and external rotation 5/5   o Right Knee :  Inspection/Palpation : no tenderness to palpation, no swelling  Range of Motion : active flexion and extension full and painless, no crepitance  Stability : no valgus or varus instability present on provocative testing  Strength : flexion and extension 5/5, glute 5/5 hamstring 5/5   Tests and Signs : negative Anterior Drawer, negative Lachman, negative Brady  Left Lower Extremity o Buttock : no tenderness, swelling or deformities  o Left Hip :  Inspection/Palpation : no tenderness, no swelling or deformities  Range of Motion : full and painless in all planes, no crepitance  Stability : joint stability intact  Strength : extension, flexion, adduction, abduction, internal rotation and external rotation 5/5  o Left Knee :  Inspection/Palpation : no medial compartment tenderness no medial or lateral facet tenderness to palpation, mild swelling  Range of Motion : 0-140 active flexion and extension full and painless, no crepitance  Stability : no valgus or varus instability present on provocative testing  Strength : flexion and extension 5/5, glute 5/5 and hamstring 5/5  Tests and Signs : negative Anterior Drawer, negative Lachman, negative Brady  Gait and Station: Gait: no cane,  no significant extremity swelling or lymphedema, good proprioception and balance  Radiology Results o Left Knee : Standing AP, lateral, tunnel, and skyline views of the knee were obtained and reveal mild to moderate medial and moderate patellofemoral arthritis

## 2024-01-15 ENCOUNTER — TRANSCRIPTION ENCOUNTER (OUTPATIENT)
Age: 59
End: 2024-01-15

## 2024-01-26 NOTE — ED ADULT NURSE NOTE - CADM POA PRESS ULCER
January 29, 2024      Kelseat[place] Valentine  [id number] [facility] [id number]        Ms. Valentine,     Negative influenza screening.     John Gutierrez PA-C     Resulted Orders   Influenza A & B Antigen - Clinic Collect   Result Value Ref Range    Influenza A antigen Negative Negative    Influenza B antigen Negative Negative    Narrative    Test results must be correlated with clinical data. If necessary, results should be confirmed by a molecular assay or viral culture.                  No

## 2024-02-21 ENCOUNTER — APPOINTMENT (OUTPATIENT)
Dept: ORTHOPEDIC SURGERY | Facility: CLINIC | Age: 59
End: 2024-02-21
Payer: MEDICARE

## 2024-02-21 DIAGNOSIS — M23.92 UNSPECIFIED INTERNAL DERANGEMENT OF LEFT KNEE: ICD-10-CM

## 2024-02-21 PROCEDURE — 99214 OFFICE O/P EST MOD 30 MIN: CPT

## 2024-02-22 ENCOUNTER — APPOINTMENT (OUTPATIENT)
Dept: MRI IMAGING | Facility: HOSPITAL | Age: 59
End: 2024-02-22
Payer: MEDICARE

## 2024-02-22 ENCOUNTER — OUTPATIENT (OUTPATIENT)
Dept: OUTPATIENT SERVICES | Facility: HOSPITAL | Age: 59
LOS: 1 days | End: 2024-02-22
Payer: MEDICARE

## 2024-02-22 DIAGNOSIS — S02.609A FRACTURE OF MANDIBLE, UNSPECIFIED, INITIAL ENCOUNTER FOR CLOSED FRACTURE: Chronic | ICD-10-CM

## 2024-02-22 DIAGNOSIS — Z98.890 OTHER SPECIFIED POSTPROCEDURAL STATES: Chronic | ICD-10-CM

## 2024-02-22 DIAGNOSIS — Z96.0 PRESENCE OF UROGENITAL IMPLANTS: Chronic | ICD-10-CM

## 2024-02-22 DIAGNOSIS — M23.92 UNSPECIFIED INTERNAL DERANGEMENT OF LEFT KNEE: ICD-10-CM

## 2024-02-22 DIAGNOSIS — M77.32 CALCANEAL SPUR, LEFT FOOT: Chronic | ICD-10-CM

## 2024-02-22 PROCEDURE — 73721 MRI JNT OF LWR EXTRE W/O DYE: CPT

## 2024-02-22 PROCEDURE — 73721 MRI JNT OF LWR EXTRE W/O DYE: CPT | Mod: 26,LT,MH

## 2024-02-28 ENCOUNTER — APPOINTMENT (OUTPATIENT)
Dept: ORTHOPEDIC SURGERY | Facility: CLINIC | Age: 59
End: 2024-02-28
Payer: MEDICARE

## 2024-02-28 VITALS — HEIGHT: 72 IN | BODY MASS INDEX: 21.26 KG/M2 | WEIGHT: 157 LBS

## 2024-02-28 PROCEDURE — 99214 OFFICE O/P EST MOD 30 MIN: CPT

## 2024-02-28 NOTE — DISCUSSION/SUMMARY
[de-identified] : I went over the pathophysiology of the patient's symptoms in great detail with the patient. I went over the patient's MRI results with them in great detail and used models to aid in my explanation. We discussed the use of ice, Tylenol and anti-inflammatories to relieve pain.   All of their questions were answered. They understand and consent to the plan.   FU in 6 weeks.

## 2024-02-28 NOTE — HISTORY OF PRESENT ILLNESS
[de-identified] : 58 year old male presents for a left knee MRI review today. I went over the patient's MRI results with them in great detail and used models to aid in my explanation. He is s/p right shoulder arthroscopy done on 10/27/2020.  He presents with a list of questions that is typed and we have prepared to answer them well. He denies any swelling but does feel  buckling.   [facility] MRI LEFT KNEE revealed: done on 2/22/2024 IMPRESSION:  Intact menisci.  Minimal cartilage wear within the medial and lateral compartments.  Mild subchondral edema within the proximal fibula, which may be related to mild cartilage wear at the proximal tibiofibular interval.  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

## 2024-02-28 NOTE — ADDENDUM
[FreeTextEntry1] : I, GIOVANNA SETH, acted solely as a scribe for Dr. Wan Coelho on this date 02/28/2024.  All medical record entries made by the Scribe were at my, Dr. Wan Coelho, direction and personally dictated by me on 02/28/2024. I have reviewed the chart and agree that the record accurately reflects my personal performance of the history, physical exam, assessment and plan. I have also personally directed, reviewed, and agreed with the chart.

## 2024-02-28 NOTE — PHYSICAL EXAM
[UE] : 5/5 motor strength in bilateral upper extremities [de-identified] : Constitutional o Appearance : well-nourished, well developed, alert, in no acute distress  Head and Face o Head :  Inspection : atraumatic, normocephalic o Face :  Inspection : no visible rash or discoloration Respiratory o Respiratory Effort: breathing unlabored  Neurologic o Mental Status Examination :  Orientation : alert and oriented X 3 Psychiatric o Mood and Affect: mood normal, affect appropriate Cardiovascular o Observation/Palpation : - no swelling Lymphatic o Additional Nodes : No palpable lymph nodes present  Right Lower Extremity o Buttock : no tenderness, swelling or deformities  o Right Hip :  Inspection/Palpation : no tenderness, swelling or deformities  Range of Motion : full and painless in all planes, no crepitance  Stability : joint stability intact  Strength : extension, flexion, adduction, abduction, internal rotation and external rotation 5/5   o Right Knee :  Inspection/Palpation : no medial or lateral compartment tenderness  tenderness to palpation, no swelling  Range of Motion : 0-135 active flexion and extension full and painless, no crepitance  Stability : no valgus or varus instability present on provocative testing  Strength : flexion and extension 5/5, glute 5/5 hamstring 5/5   Tests and Signs : negative Anterior Drawer, negative Lachman, negative Brady  Left Lower Extremity o Buttock : no tenderness, swelling or deformities  o Left Hip :  Inspection/Palpation : no tenderness, no swelling or deformities  Range of Motion : full and painless in all planes, no crepitance  Stability : joint stability intact  Strength : extension, flexion, adduction, abduction, internal rotation and external rotation 5/5  o Left Knee :  Inspection/Palpation : no medial compartment tenderness no medial or lateral facet tenderness. hyper throphic change of the medial tibial nasreen to palpation, mild swelling  Range of Motion : 0-135 active flexion and extension full and painless, no crepitance  Stability : no valgus or varus instability present on provocative testing  Strength : flexion and extension 5/5, glute 5/5 and hamstring 4/5  Tests and Signs : negative Anterior Drawer, negative Lachman, positive Brady  Gait and Station: Gait: no cane,  tight hamstrings bilaterally, no significant extremity swelling or lymphedema, good proprioception and balance

## 2024-03-18 ENCOUNTER — APPOINTMENT (OUTPATIENT)
Dept: ORTHOPEDIC SURGERY | Facility: CLINIC | Age: 59
End: 2024-03-18
Payer: MEDICARE

## 2024-03-18 PROCEDURE — 99214 OFFICE O/P EST MOD 30 MIN: CPT

## 2024-03-18 NOTE — HISTORY OF PRESENT ILLNESS
[de-identified] : 58 year old male presents for a left knee follow-up. He denies any swelling or buckling. He is not ambulating with a cane. He is s/p right shoulder arthroscopy done on 10/27/2020.  He presents with a list of questions that is typed and we have prepared to answer them well. He states he has been carrying spring plants which weight about 40 pounds. He is interested in going to physical therapy.   [facility] MRI LEFT KNEE revealed: done on 2/22/2024 IMPRESSION:  Intact menisci.  Minimal cartilage wear within the medial and lateral compartments.  Mild subchondral edema within the proximal fibula, which may be related to mild cartilage wear at the proximal tibiofibular interval.  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

## 2024-03-18 NOTE — ADDENDUM
[FreeTextEntry1] : I, GIOVANNA SETH, acted solely as a scribe for Dr. Wan Coelho on this date 03/18/2024.  All medical record entries made by the Scribe were at my, Dr. Wan Coelho, direction and personally dictated by me on 03/18/2024. I have reviewed the chart and agree that the record accurately reflects my personal performance of the history, physical exam, assessment and plan. I have also personally directed, reviewed, and agreed with the chart.

## 2024-03-18 NOTE — DISCUSSION/SUMMARY
[de-identified] : I went over the pathophysiology of the patient's symptoms in great detail with the patient. He needs to avoid high-impact activities such as running and jumping or riding a treadmill. I recommend alternative activities such as riding a stationary bike or elliptical on low tension. He should focus on light weight and high repetition exercises. He should avoid squatting and kneeling. At this time, he will start a course of physical therapy for strengthening and flexibility. A prescription was provided.   All of their questions were answered. They understand and consent to the plan.   FU in one month

## 2024-03-18 NOTE — PHYSICAL EXAM
[UE] : 5/5 motor strength in bilateral upper extremities [de-identified] : Constitutional o Appearance : well-nourished, well developed, alert, in no acute distress  Head and Face o Head :  Inspection : atraumatic, normocephalic o Face :  Inspection : no visible rash or discoloration Respiratory o Respiratory Effort: breathing unlabored  Neurologic o Mental Status Examination :  Orientation : alert and oriented X 3 Psychiatric o Mood and Affect: mood normal, affect appropriate Cardiovascular o Observation/Palpation : - no swelling Lymphatic o Additional Nodes : No palpable lymph nodes present  Right Lower Extremity o Buttock : no tenderness, swelling or deformities  o Right Hip :  Inspection/Palpation : no tenderness, swelling or deformities  Range of Motion : full and painless in all planes, no crepitance  Stability : joint stability intact  Strength : extension, flexion, adduction, abduction, internal rotation and external rotation 5/5   o Right Knee :  Inspection/Palpation : no medial or lateral compartment tenderness  tenderness to palpation, no swelling  Range of Motion : 0-135 active flexion and extension full and painless, no crepitance  Stability : no valgus or varus instability present on provocative testing  Strength : flexion and extension 5/5, glute 5/5 hamstring 5/5   Tests and Signs : negative Anterior Drawer, negative Lachman, negative Brady  Left Lower Extremity o Buttock : no tenderness, swelling or deformities  o Left Hip :  Inspection/Palpation : no tenderness, no swelling or deformities  Range of Motion : full and painless in all planes, no crepitance  Stability : joint stability intact  Strength : extension, flexion, adduction, abduction, internal rotation and external rotation 5/5  o Left Knee :  Inspection/Palpation : no medial compartment tenderness no medial or lateral facet tenderness. hyper throphic change of the medial tibial nasreen to palpation, no swelling, patella magna   Range of Motion : 0-145 active flexion and extension full and painless, no crepitance  Stability : no valgus or varus instability present on provocative testing  Strength : flexion and extension 5/5, glute 5/5 and hamstring 4/5  Tests and Signs : negative Anterior Drawer, negative Lachman, positive Brady  Gait and Station: Gait: no cane,  tight hamstrings bilaterally, no significant extremity swelling or lymphedema, good proprioception and balance

## 2024-03-22 ENCOUNTER — NON-APPOINTMENT (OUTPATIENT)
Age: 59
End: 2024-03-22

## 2024-05-03 ENCOUNTER — OUTPATIENT (OUTPATIENT)
Dept: OUTPATIENT SERVICES | Facility: HOSPITAL | Age: 59
LOS: 1 days | End: 2024-05-03
Payer: MEDICARE

## 2024-05-03 ENCOUNTER — APPOINTMENT (OUTPATIENT)
Dept: MRI IMAGING | Facility: HOSPITAL | Age: 59
End: 2024-05-03
Payer: MEDICARE

## 2024-05-03 DIAGNOSIS — Z00.8 ENCOUNTER FOR OTHER GENERAL EXAMINATION: ICD-10-CM

## 2024-05-03 DIAGNOSIS — M77.32 CALCANEAL SPUR, LEFT FOOT: Chronic | ICD-10-CM

## 2024-05-03 DIAGNOSIS — Z96.0 PRESENCE OF UROGENITAL IMPLANTS: Chronic | ICD-10-CM

## 2024-05-03 DIAGNOSIS — S02.609A FRACTURE OF MANDIBLE, UNSPECIFIED, INITIAL ENCOUNTER FOR CLOSED FRACTURE: Chronic | ICD-10-CM

## 2024-05-03 DIAGNOSIS — Z98.890 OTHER SPECIFIED POSTPROCEDURAL STATES: Chronic | ICD-10-CM

## 2024-05-03 PROCEDURE — 73718 MRI LOWER EXTREMITY W/O DYE: CPT | Mod: 26,RT,MH

## 2024-05-03 PROCEDURE — 73718 MRI LOWER EXTREMITY W/O DYE: CPT | Mod: MH

## 2024-05-09 ENCOUNTER — APPOINTMENT (OUTPATIENT)
Dept: ORTHOPEDIC SURGERY | Facility: CLINIC | Age: 59
End: 2024-05-09
Payer: MEDICARE

## 2024-05-09 VITALS — WEIGHT: 157 LBS | BODY MASS INDEX: 21.26 KG/M2 | HEIGHT: 72 IN

## 2024-05-09 DIAGNOSIS — M17.12 UNILATERAL PRIMARY OSTEOARTHRITIS, LEFT KNEE: ICD-10-CM

## 2024-05-09 PROCEDURE — 99213 OFFICE O/P EST LOW 20 MIN: CPT

## 2024-05-09 NOTE — ADDENDUM
[FreeTextEntry1] : I, GIOVANNA SETH, acted solely as a scribe for Dr. Wan Coelho on this date 05/09/2024.  All medical record entries made by the Scribe were at my, Dr. Wan Coelho, direction and personally dictated by me on 05/09/2024. I have reviewed the chart and agree that the record accurately reflects my personal performance of the history, physical exam, assessment and plan. I have also personally directed, reviewed, and agreed with the chart.

## 2024-05-09 NOTE — PHYSICAL EXAM
[UE] : 5/5 motor strength in bilateral upper extremities [de-identified] : Constitutional o Appearance : well-nourished, well developed, alert, in no acute distress  Head and Face o Head :  Inspection : atraumatic, normocephalic o Face :  Inspection : no visible rash or discoloration Respiratory o Respiratory Effort: breathing unlabored  Neurologic o Mental Status Examination :  Orientation : alert and oriented X 3 Psychiatric o Mood and Affect: mood normal, affect appropriate Cardiovascular o Observation/Palpation : - no swelling Lymphatic o Additional Nodes : No palpable lymph nodes present  Right Lower Extremity o Buttock : no tenderness, swelling or deformities  o Right Hip :  Inspection/Palpation : no tenderness, swelling or deformities  Range of Motion : full and painless in all planes, no crepitance  Stability : joint stability intact  Strength : extension, flexion, adduction, abduction, internal rotation and external rotation 5/5   o Right Knee :  Inspection/Palpation : no medial or lateral compartment tenderness  tenderness to palpation, no swelling  Range of Motion : 0-135 active flexion and extension full and painless, no crepitance  Stability : no valgus or varus instability present on provocative testing  Strength : flexion and extension 5/5, glute 5/5 hamstring 5/5   Tests and Signs : negative Anterior Drawer, negative Lachman, negative Brady  Left Lower Extremity o Buttock : no tenderness, swelling or deformities  o Left Hip :  Inspection/Palpation : no tenderness, no swelling or deformities  Range of Motion : full and painless in all planes, no crepitance  Stability : joint stability intact  Strength : extension, flexion, adduction, abduction, internal rotation and external rotation 4-/5  o Left Knee :  Inspection/Palpation : no medial compartment tenderness no medial or lateral facet tenderness. hyper throphic change of the medial tibial nasreen to palpation, no swelling, patella magna, no bakers cyst, no tenderness over the pes tedons   Range of Motion : 0-135 active flexion and extension full and painless, no crepitance  Stability : no valgus or varus instability present on provocative testing  Strength : flexion and extension 4-/5  Tests and Signs : negative Anterior Drawer, negative Lachman, positive Brady  Gait and Station: Gait: no cane,  tight hamstrings bilaterally, no significant extremity swelling or lymphedema, good proprioception and balance

## 2024-05-09 NOTE — HISTORY OF PRESENT ILLNESS
[de-identified] : 58 year old male presents for a left knee follow-up. He went to physical therapy every other day. He notes he test his right knee pain by running a few strides. He is not sure if he is still improving. He notes he feels some discomfort with leaning. He denies any swelling or buckling. He notes he feels soreness of his left knee. He has not had any recent injections. He is s/p right shoulder arthroscopy done on 10/27/2020.    [facility] MRI LEFT KNEE revealed: done on 2/22/2024 IMPRESSION:  Intact menisci.  Minimal cartilage wear within the medial and lateral compartments.  Mild subchondral edema within the proximal fibula, which may be related to mild cartilage wear at the proximal tibiofibular interval.  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

## 2024-05-09 NOTE — DISCUSSION/SUMMARY
[de-identified] : I went over the pathophysiology of the patient's symptoms in great detail with the patient. I am recommending the patient continues to go to physical therapy to obtain increases in their strength and mobility. A prescription was provided. We discussed the use of ice, Tylenol and anti-inflammatories to relieve pain.   All of their questions were answered. They understand and consent to the plan.   FU in one month. We will consider a left knee gel injection.

## 2024-06-10 ENCOUNTER — APPOINTMENT (OUTPATIENT)
Dept: ORTHOPEDIC SURGERY | Facility: CLINIC | Age: 59
End: 2024-06-10

## 2024-06-28 ENCOUNTER — NON-APPOINTMENT (OUTPATIENT)
Age: 59
End: 2024-06-28

## 2024-06-30 ENCOUNTER — EMERGENCY (EMERGENCY)
Facility: HOSPITAL | Age: 59
LOS: 1 days | Discharge: HOME | End: 2024-06-30
Attending: INTERNAL MEDICINE | Admitting: INTERNAL MEDICINE
Payer: MEDICARE

## 2024-06-30 VITALS
WEIGHT: 160.06 LBS | HEART RATE: 68 BPM | SYSTOLIC BLOOD PRESSURE: 101 MMHG | HEIGHT: 72 IN | OXYGEN SATURATION: 96 % | RESPIRATION RATE: 17 BRPM | TEMPERATURE: 97 F | DIASTOLIC BLOOD PRESSURE: 67 MMHG

## 2024-06-30 DIAGNOSIS — M77.32 CALCANEAL SPUR, LEFT FOOT: Chronic | ICD-10-CM

## 2024-06-30 DIAGNOSIS — S02.609A FRACTURE OF MANDIBLE, UNSPECIFIED, INITIAL ENCOUNTER FOR CLOSED FRACTURE: Chronic | ICD-10-CM

## 2024-06-30 DIAGNOSIS — Z98.890 OTHER SPECIFIED POSTPROCEDURAL STATES: Chronic | ICD-10-CM

## 2024-06-30 DIAGNOSIS — Z96.0 PRESENCE OF UROGENITAL IMPLANTS: Chronic | ICD-10-CM

## 2024-06-30 PROCEDURE — 99284 EMERGENCY DEPT VISIT MOD MDM: CPT

## 2024-06-30 PROCEDURE — 99284 EMERGENCY DEPT VISIT MOD MDM: CPT | Mod: 25

## 2024-06-30 PROCEDURE — 93971 EXTREMITY STUDY: CPT

## 2024-06-30 PROCEDURE — 93971 EXTREMITY STUDY: CPT | Mod: 26,RT

## 2024-07-03 ENCOUNTER — APPOINTMENT (OUTPATIENT)
Dept: FAMILY MEDICINE | Facility: CLINIC | Age: 59
End: 2024-07-03
Payer: MEDICARE

## 2024-07-03 VITALS
HEART RATE: 65 BPM | SYSTOLIC BLOOD PRESSURE: 95 MMHG | HEIGHT: 72 IN | BODY MASS INDEX: 21.94 KG/M2 | WEIGHT: 162 LBS | DIASTOLIC BLOOD PRESSURE: 60 MMHG | RESPIRATION RATE: 12 BRPM | TEMPERATURE: 98.2 F | OXYGEN SATURATION: 97 %

## 2024-07-03 DIAGNOSIS — M79.659 PAIN IN UNSPECIFIED THIGH: ICD-10-CM

## 2024-07-03 DIAGNOSIS — M76.30 ILIOTIBIAL BAND SYNDROME, UNSPECIFIED LEG: ICD-10-CM

## 2024-07-03 PROCEDURE — 99213 OFFICE O/P EST LOW 20 MIN: CPT

## 2024-07-03 PROCEDURE — G2211 COMPLEX E/M VISIT ADD ON: CPT

## 2024-07-08 ENCOUNTER — APPOINTMENT (OUTPATIENT)
Dept: FAMILY MEDICINE | Facility: CLINIC | Age: 59
End: 2024-07-08
Payer: MEDICARE

## 2024-07-08 VITALS
OXYGEN SATURATION: 96 % | WEIGHT: 164 LBS | HEART RATE: 70 BPM | BODY MASS INDEX: 22.21 KG/M2 | RESPIRATION RATE: 12 BRPM | DIASTOLIC BLOOD PRESSURE: 60 MMHG | SYSTOLIC BLOOD PRESSURE: 95 MMHG | HEIGHT: 72 IN | TEMPERATURE: 99.1 F

## 2024-07-08 DIAGNOSIS — M79.651 PAIN IN RIGHT THIGH: ICD-10-CM

## 2024-07-08 PROCEDURE — G2211 COMPLEX E/M VISIT ADD ON: CPT

## 2024-07-08 PROCEDURE — 99213 OFFICE O/P EST LOW 20 MIN: CPT

## 2024-07-16 ENCOUNTER — APPOINTMENT (OUTPATIENT)
Dept: ORTHOPEDIC SURGERY | Facility: CLINIC | Age: 59
End: 2024-07-16
Payer: MEDICARE

## 2024-07-16 ENCOUNTER — APPOINTMENT (OUTPATIENT)
Dept: MRI IMAGING | Facility: HOSPITAL | Age: 59
End: 2024-07-16
Payer: MEDICARE

## 2024-07-16 ENCOUNTER — OUTPATIENT (OUTPATIENT)
Dept: OUTPATIENT SERVICES | Facility: HOSPITAL | Age: 59
LOS: 1 days | End: 2024-07-16
Payer: MEDICARE

## 2024-07-16 VITALS — BODY MASS INDEX: 22.21 KG/M2 | HEIGHT: 72 IN | WEIGHT: 164 LBS

## 2024-07-16 VITALS — HEIGHT: 72 IN | WEIGHT: 164 LBS | BODY MASS INDEX: 22.21 KG/M2

## 2024-07-16 DIAGNOSIS — M47.816 SPONDYLOSIS WITHOUT MYELOPATHY OR RADICULOPATHY, LUMBAR REGION: ICD-10-CM

## 2024-07-16 DIAGNOSIS — Z96.0 PRESENCE OF UROGENITAL IMPLANTS: Chronic | ICD-10-CM

## 2024-07-16 DIAGNOSIS — M16.0 BILATERAL PRIMARY OSTEOARTHRITIS OF HIP: ICD-10-CM

## 2024-07-16 DIAGNOSIS — Z98.890 OTHER SPECIFIED POSTPROCEDURAL STATES: Chronic | ICD-10-CM

## 2024-07-16 DIAGNOSIS — M77.32 CALCANEAL SPUR, LEFT FOOT: Chronic | ICD-10-CM

## 2024-07-16 PROCEDURE — 72148 MRI LUMBAR SPINE W/O DYE: CPT | Mod: 26,MH

## 2024-07-16 PROCEDURE — 72148 MRI LUMBAR SPINE W/O DYE: CPT

## 2024-07-16 PROCEDURE — 99214 OFFICE O/P EST MOD 30 MIN: CPT

## 2024-07-23 ENCOUNTER — NON-APPOINTMENT (OUTPATIENT)
Age: 59
End: 2024-07-23

## 2024-07-25 ENCOUNTER — APPOINTMENT (OUTPATIENT)
Dept: ORTHOPEDIC SURGERY | Facility: CLINIC | Age: 59
End: 2024-07-25
Payer: MEDICARE

## 2024-07-25 VITALS — WEIGHT: 164 LBS | HEIGHT: 72 IN | BODY MASS INDEX: 22.21 KG/M2

## 2024-07-25 DIAGNOSIS — M48.061 SPINAL STENOSIS, LUMBAR REGION WITHOUT NEUROGENIC CLAUDICATION: ICD-10-CM

## 2024-07-25 DIAGNOSIS — M47.816 SPONDYLOSIS W/OUT MYELOPATHY OR RADICULOPATHY, LUMBAR REGION: ICD-10-CM

## 2024-07-25 PROBLEM — M54.16 LUMBAR RADICULOPATHY, RIGHT: Status: ACTIVE | Noted: 2024-07-25

## 2024-07-25 PROCEDURE — 99214 OFFICE O/P EST MOD 30 MIN: CPT

## 2024-07-25 NOTE — ADDENDUM
[FreeTextEntry1] : I, GIOVANNA SETH, acted solely as a scribe for Dr. Wan Coelho on this date 07/25/2024.  All medical record entries made by the Scribe were at my, Dr. Wan Coelho, direction and personally dictated by me on 07/25/2024. I have reviewed the chart and agree that the record accurately reflects my personal performance of the history, physical exam, assessment and plan. I have also personally directed, reviewed, and agreed with the chart.

## 2024-07-25 NOTE — HISTORY OF PRESENT ILLNESS
[de-identified] : 58-year-old male presents for an MRI review of his lower back today 7/25/2024. I went over the patient's MRI results with them in great detail and used models to aid in my explanation. He notes he has chronic lower back pain. He notes he has right leg pain that does radiate past the knee. He notes he has some tingling of his right thigh. He notes the pain and numbness is constant. He is s/p right shoulder arthroscopy done on 10/27/2020.    [facility] MRI LUMBAR SPINE done on 7/16/2024 IMPRESSION:  *  Mild multilevel lumbar spondylosis with mild spinal canal stenosis and severe left greater than right foraminal narrowing at L4-L5. *  Additional moderate to severe bilateral foraminal narrowing at L5-S1.  --- End of Report ---  Radiology Results 7/16/2024 o Lumbosacral Spine : AP and lateral views were obtained and revealed diffuse facet arthropathy with foraminal stenosis at L3-4 and L4-5 o Pelvis : AP pelvis was obtained and revealed moderate arthritis of both hips right worse than left  [facility] MRI LEFT KNEE revealed: done on 2/22/2024 IMPRESSION:  Intact menisci.  Minimal cartilage wear within the medial and lateral compartments.  Mild subchondral edema within the proximal fibula, which may be related to mild cartilage wear at the proximal tibiofibular interval.  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

## 2024-07-25 NOTE — DISCUSSION/SUMMARY
[de-identified] : I went over the pathophysiology of the patient's symptoms in great detail with the patient. I went over the patient's MRI results with them in great detail and used models to aid in my explanation. We are prescribing the patient EMG nerve conduction test at this time. We discussed the use of ice, Tylenol and anti-inflammatories to relieve pain. He should avoid twisting and arching of his lower back. He should be pulling his knees to his chest.   All of their questions were answered. They understand and consent to the plan.   FU after the EMG testing.

## 2024-07-25 NOTE — PHYSICAL EXAM
[UE] : 5/5 motor strength in bilateral upper extremities [de-identified] : Constitutional o Appearance : well-nourished, well developed, alert, in no acute distress  Head and Face o Head :  Inspection : atraumatic, normocephalic o Face :  Inspection : no visible rash or discoloration Respiratory o Respiratory Effort: breathing unlabored  Neurologic o Mental Status Examination :  Orientation : alert and oriented X 3 Psychiatric o Mood and Affect: mood normal, affect appropriate Cardiovascular o Observation/Palpation : - no swelling Lymphatic o Additional Nodes : No palpable lymph nodes present  Lum bosacral Spine o Inspection : no visible rash or discoloration o Palpation : no paraspinal musculature tenderness o Range of Motion : extension reproduces hi symptoms, rotation causes lower back pain,  o Muscle Strength : paraspinal muscle strength and tone within normal limits o Muscle Tone : paraspinal muscle strength and tone within normal limits o Tests: straight leg test negative bilaterally, JEANETTE test negative bilaterally   Right Lower Extremity o Buttock : no tenderness, swelling or deformities  o Right Hip :  Inspection/Palpation : no tenderness, swelling or deformities  Range of Motion : full and painless in all planes, no crepitance  Stability : joint stability intact  Strength : extension, flexion, adduction, abduction 5/5, internal rotation and external rotation 5/5   o Right Knee :  Inspection/Palpation : no medial or lateral compartment tenderness  tenderness to palpation, no swelling  Range of Motion : 0-135 active flexion and extension full and painless, no crepitance  Stability : no valgus or varus instability present on provocative testing  Strength : flexion and extension 5/5, glute 5/5 hamstring 5/5   Tests and Signs : negative Anterior Drawer, negative Lachman, negative Brady  Left Lower Extremity o Buttock : no tenderness, swelling or deformities  o Left Hip :  Inspection/Palpation : no tenderness, no swelling or deformities  Range of Motion : full and painless in all planes, no crepitance  Stability : joint stability intact  Strength : extension, flexion, adduction, abduction, internal rotation and external rotation 5/5  o Left Knee :  Inspection/Palpation : no medial compartment tenderness no medial or lateral facet tenderness. hyper throphic change of the medial tibial nasreen to palpation, no swelling, patella magna, no bakers cyst, no tenderness over the pes tedons   Range of Motion : 0-135 active flexion and extension full and painless, no crepitance  Stability : no valgus or varus instability present on provocative testing  Strength : flexion and extension 5/5  Tests and Signs : negative Anterior Drawer, negative Lachman, positive Brady  Gait and Station: Gait: no cane, tight hamstrings bilaterally, no significant extremity swelling or lymphedema, good proprioception and balance, normal sensation to light touch

## 2024-07-29 ENCOUNTER — APPOINTMENT (OUTPATIENT)
Dept: FAMILY MEDICINE | Facility: CLINIC | Age: 59
End: 2024-07-29
Payer: MEDICARE

## 2024-07-29 VITALS
WEIGHT: 160 LBS | OXYGEN SATURATION: 98 % | RESPIRATION RATE: 12 BRPM | HEIGHT: 72 IN | TEMPERATURE: 97.5 F | BODY MASS INDEX: 21.67 KG/M2 | DIASTOLIC BLOOD PRESSURE: 62 MMHG | HEART RATE: 63 BPM | SYSTOLIC BLOOD PRESSURE: 90 MMHG

## 2024-07-29 DIAGNOSIS — R49.0 DYSPHONIA: ICD-10-CM

## 2024-07-29 DIAGNOSIS — H61.20 IMPACTED CERUMEN, UNSPECIFIED EAR: ICD-10-CM

## 2024-07-29 PROCEDURE — 99213 OFFICE O/P EST LOW 20 MIN: CPT

## 2024-07-29 RX ORDER — OMEPRAZOLE 20 MG/1
20 CAPSULE, DELAYED RELEASE ORAL
Qty: 30 | Refills: 0 | Status: ACTIVE | COMMUNITY
Start: 2024-07-29 | End: 1900-01-01

## 2024-07-29 NOTE — ASSESSMENT
[FreeTextEntry1] : # Hoarseness Trial of antacids Referred to ENT for possible laryngoscopy  # Cerumen impaction Trial of debrox drops or can see ENT  f/u PRN

## 2024-07-29 NOTE — HISTORY OF PRESENT ILLNESS
[FreeTextEntry8] : Pt comes in for eval of possible laryngitis. Feels he has had this for about 1 month. First started w/ scratchy throat, otherwise no fever/chills, cough, etc. Since it started he thought it got a little worse and now back to how it started. Never smoker. No runny nose, postnasal drip, allergies.

## 2024-07-29 NOTE — PHYSICAL EXAM
[Normal] : no acute distress, well nourished, well developed and well-appearing [Normal Sclera/Conjunctiva] : normal sclera/conjunctiva [Normal Outer Ear/Nose] : the outer ears and nose were normal in appearance [No Lymphadenopathy] : no lymphadenopathy [Supple] : supple [No Respiratory Distress] : no respiratory distress  [Normal Supraclavicular Nodes] : no supraclavicular lymphadenopathy [Normal Anterior Cervical Nodes] : no anterior cervical lymphadenopathy [Normal Affect] : the affect was normal [Alert and Oriented x3] : oriented to person, place, and time [Normal Insight/Judgement] : insight and judgment were intact [de-identified] : mildly erythematous oropharynx. R ear 100% cerumen impaction, L ear normal TM and canal

## 2024-07-29 NOTE — REVIEW OF SYSTEMS
[Earache] : no earache [Nasal Discharge] : no nasal discharge [Sore Throat] : no sore throat [Hoarseness] : hoarseness [Cough] : no cough [Negative] : Constitutional

## 2024-07-31 ENCOUNTER — APPOINTMENT (OUTPATIENT)
Dept: PHYSICAL MEDICINE AND REHAB | Facility: CLINIC | Age: 59
End: 2024-07-31
Payer: MEDICARE

## 2024-07-31 VITALS — BODY MASS INDEX: 21.67 KG/M2 | HEIGHT: 72 IN | WEIGHT: 160 LBS

## 2024-07-31 DIAGNOSIS — M54.16 RADICULOPATHY, LUMBAR REGION: ICD-10-CM

## 2024-07-31 PROCEDURE — 95908 NRV CNDJ TST 3-4 STUDIES: CPT

## 2024-07-31 PROCEDURE — 95886 MUSC TEST DONE W/N TEST COMP: CPT | Mod: RT

## 2024-07-31 NOTE — PROCEDURE
[de-identified] : PRE-PROCEDURE  * Was H&P completed and in chart at time of procedure ?  YES * Were the indications for the procedure appropriate ?  YES * Were the practitioner's entries in the patient's medical record appropriate ?  YES  PROCEDURE * Did the practitioner maintain proper sterile technique ?  YES * If bleeding was encountered, did the practitioner manage it appropriately?  YES * Were complications, if any, recognized and managed appropriately?  YES * Was the practitioner's use of diagnostic services (e.g., lab, x-ray, MRI, CT) appropriate?  YES  POST-PROCEDURE * Did the pre-procedure diagnosis coincide with the findings of the procedure?  YES * Was the procedure report complete, accurate and timely?  YES

## 2024-07-31 NOTE — ASSESSMENT
[FreeTextEntry1] : EMG/NCS RIGHT LE performed at today's office visit.  EDX findings are suspicious for chronic right L5 radiculopathy affecting the motor axons.  Please note that the patient's electrodiagnostic findings do not correlate well with his chief complaint of anterior thigh pain.  Clinical correlation advised.  Full report to follow.

## 2024-08-13 ENCOUNTER — NON-APPOINTMENT (OUTPATIENT)
Age: 59
End: 2024-08-13

## 2024-08-15 ENCOUNTER — APPOINTMENT (OUTPATIENT)
Dept: ORTHOPEDIC SURGERY | Facility: CLINIC | Age: 59
End: 2024-08-15
Payer: MEDICARE

## 2024-08-15 VITALS — BODY MASS INDEX: 21.67 KG/M2 | HEIGHT: 72 IN | WEIGHT: 160 LBS

## 2024-08-15 DIAGNOSIS — M48.061 SPINAL STENOSIS, LUMBAR REGION WITHOUT NEUROGENIC CLAUDICATION: ICD-10-CM

## 2024-08-15 DIAGNOSIS — M76.891 OTHER SPECIFIED ENTHESOPATHIES OF RIGHT LOWER LIMB, EXCLUDING FOOT: ICD-10-CM

## 2024-08-15 DIAGNOSIS — M16.0 BILATERAL PRIMARY OSTEOARTHRITIS OF HIP: ICD-10-CM

## 2024-08-15 PROCEDURE — 99214 OFFICE O/P EST MOD 30 MIN: CPT

## 2024-08-15 NOTE — PHYSICAL EXAM
[UE] : 5/5 motor strength in bilateral upper extremities [de-identified] : Constitutional o Appearance : well-nourished, well developed, alert, in no acute distress  Head and Face o Head :  Inspection : atraumatic, normocephalic o Face :  Inspection : no visible rash or discoloration Respiratory o Respiratory Effort: breathing unlabored  Neurologic o Mental Status Examination :  Orientation : alert and oriented X 3 Psychiatric o Mood and Affect: mood normal, affect appropriate Cardiovascular o Observation/Palpation : - no swelling Lymphatic o Additional Nodes : No palpable lymph nodes present  Lum bosacral Spine o Inspection : no visible rash or discoloration o Palpation : no paraspinal musculature tenderness o Range of Motion : extension reproduces hi symptoms, rotation causes lower back pain,  o Muscle Strength : paraspinal muscle strength and tone within normal limits o Muscle Tone : paraspinal muscle strength and tone within normal limits o Tests: straight leg test negative bilaterally, JEANETTE test negative bilaterally   Right Lower Extremity o Buttock : no tenderness, swelling or deformities  o Right Hip :  Inspection/Palpation : no tenderness, swelling or deformities  Range of Motion : full and painless in all planes, no crepitance  Stability : joint stability intact  Strength : extension, flexion, adduction, abduction 5/5, internal rotation and external rotation 4-/5   o Right Knee :  Inspection/Palpation : no medial or lateral compartment tenderness  tenderness to palpation, no swelling  Range of Motion : 0-135 active flexion and extension full and painless, no crepitance  Stability : no valgus or varus instability present on provocative testing  Strength : flexion and extension 4-/5, glute 5/5 hamstring 5/5   Tests and Signs : negative Anterior Drawer, negative Lachman, negative Brady  Left Lower Extremity o Buttock : no tenderness, swelling or deformities  o Left Hip :  Inspection/Palpation : no tenderness, no swelling or deformities  Range of Motion : full and painless in all planes, no crepitance  Stability : joint stability intact  Strength : extension, flexion, adduction, abduction, internal rotation and external rotation 5/5  o Left Knee :  Inspection/Palpation : no medial compartment tenderness no medial or lateral facet tenderness. hyper throphic change of the medial tibial nasreen to palpation, no swelling, patella magna, no bakers cyst, no tenderness over the pes tedons   Range of Motion : 0-135 active flexion and extension full and painless, no crepitance  Stability : no valgus or varus instability present on provocative testing  Strength : flexion and extension 5/5  Tests and Signs : negative Anterior Drawer, negative Lachman, positive Brady  Gait and Station: Gait: no cane, tight hamstrings bilaterally, no significant extremity swelling or lymphedema, good proprioception and balance, normal sensation to light touch

## 2024-08-15 NOTE — DISCUSSION/SUMMARY
[de-identified] : I went over the pathophysiology of the patient's symptoms in great detail with the patient. I went over the patient's EMG results with them in great detail and used models to aid in my explanation. At this time, he will start a course of physical therapy for strengthening and flexibility. A prescription was provided. We discussed the use of ice, Tylenol and anti-inflammatories to relieve pain.   All of their questions were answered. They understand and consent to the plan.   FU in 6-8 weeks.

## 2024-08-15 NOTE — HISTORY OF PRESENT ILLNESS
[de-identified] : 58-year-old male presents for a lower back pain follow-up. I went over the patient's EMG results with them in great detail and used models to aid in my explanation. He notes he has chronic lower back pain. He notes he has right leg pain that does radiate past the knee. He notes he has some tingling of his right thigh. He notes the pain and numbness is constant. He is s/p right shoulder arthroscopy done on 10/27/2020.    EMG/NCS RIGHT LE RESULTS done on 7/31/2024 EMG/NCS RIGHT LE performed at today's office visit. EDX findings are suspicious for chronic right L5 radiculopathy affecting the motor axons. Please note that the patient's electrodiagnostic findings do not correlate well with his chief complaint of anterior thigh pain. Clinical correlation advised.   [facility] MRI LUMBAR SPINE done on 7/16/2024 IMPRESSION:  *  Mild multilevel lumbar spondylosis with mild spinal canal stenosis and severe left greater than right foraminal narrowing at L4-L5. *  Additional moderate to severe bilateral foraminal narrowing at L5-S1.  --- End of Report ---  Radiology Results 7/16/2024 o Lumbosacral Spine : AP and lateral views were obtained and revealed diffuse facet arthropathy with foraminal stenosis at L3-4 and L4-5 o Pelvis : AP pelvis was obtained and revealed moderate arthritis of both hips right worse than left  [facility] MRI LEFT KNEE revealed: done on 2/22/2024 IMPRESSION:  Intact menisci.  Minimal cartilage wear within the medial and lateral compartments.  Mild subchondral edema within the proximal fibula, which may be related to mild cartilage wear at the proximal tibiofibular interval.  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

## 2024-08-19 NOTE — DATA REVIEWED
[FreeTextEntry1] : reviewed labs\par rheumatology not complete, some still peding
chance of dying of lung cancer. How much and how long you smoked helps to determine your risk level. Screening can find some cancers early, when treatment may be more likely to work.  What happens after screening?  The results of your CT scan will be sent to your doctor. Someone from your care team will explain the results of your scan and answer any questions you may have. If you need any follow-up, he or she will help you understand what to do next.  After a lung cancer screening, you can go back to your usual activities right away.  A lung cancer screening test can't tell if you have lung cancer. If your results are positive, your doctor can't tell whether an abnormal finding is a harmless nodule, cancer, or something else without doing more tests.  What can you do to help prevent lung cancer?  Some lung cancers can't be prevented. But if you smoke, quitting smoking is the best step you can take to prevent lung cancer. If you want to quit, your doctor can recommend medicines or other ways to help.  Follow-up care is a key part of your treatment and safety. Be sure to make and go to all appointments, and call your doctor if you are having problems. It's also a good idea to know your test results and keep a list of the medicines you take.  Where can you learn more?  Go to https://www.[employer].net/patientEd and enter Q940 to learn more about \"Learning About Lung Cancer Screening.\"  Current as of: October 25, 2023  Content Version: 14.1  © 6428-4502 [employer].   Care instructions adapted under license by [employer]. If you have questions about a medical condition or this instruction, always ask your healthcare professional. [employer] disclaims any warranty or liability for your use of this information.

## 2024-09-05 ENCOUNTER — APPOINTMENT (OUTPATIENT)
Dept: OTOLARYNGOLOGY | Facility: CLINIC | Age: 59
End: 2024-09-05

## 2024-09-30 ENCOUNTER — APPOINTMENT (OUTPATIENT)
Dept: FAMILY MEDICINE | Facility: CLINIC | Age: 59
End: 2024-09-30
Payer: MEDICARE

## 2024-09-30 ENCOUNTER — NON-APPOINTMENT (OUTPATIENT)
Age: 59
End: 2024-09-30

## 2024-09-30 VITALS
TEMPERATURE: 98 F | BODY MASS INDEX: 21.94 KG/M2 | WEIGHT: 162 LBS | HEART RATE: 60 BPM | SYSTOLIC BLOOD PRESSURE: 90 MMHG | HEIGHT: 72 IN | DIASTOLIC BLOOD PRESSURE: 60 MMHG | OXYGEN SATURATION: 97 % | RESPIRATION RATE: 14 BRPM

## 2024-09-30 DIAGNOSIS — Z00.00 ENCOUNTER FOR GENERAL ADULT MEDICAL EXAMINATION W/OUT ABNORMAL FINDINGS: ICD-10-CM

## 2024-09-30 PROCEDURE — 90656 IIV3 VACC NO PRSV 0.5 ML IM: CPT

## 2024-09-30 PROCEDURE — G0008: CPT

## 2024-09-30 PROCEDURE — G0439: CPT

## 2024-09-30 NOTE — PLAN
Render In Strict Bullet Format?: No
Continue Regimen: Hydrocortisone 2.5% cream twice a day\\nMoisturizer three times a day
Detail Level: Zone
[FreeTextEntry1] : #HCM -lab work script provided -vaccinations:  COVID: vaccinated, he will bring records next time  Influenza: given today by BERNICE Winchester - reports up to date  TDAP: up to date  -depression screen negative -He follows w/ GI in [place] for coloscopy -reports had normal colonoscopy last year  -F/u with Derm for skin cancer screening - reports will be seen at [place]  
[FreeTextEntry1] : #HCM -lab work script provided -vaccinations:  COVID: vaccinated, he will bring records next time  Influenza: given today by BERNICE Winchester - reports up to date  TDAP: up to date  -depression screen negative -He follows w/ GI in [place] for coloscopy -reports had normal colonoscopy last year  -F/u with Derm for skin cancer screening - reports will be seen at [place]

## 2024-09-30 NOTE — REVIEW OF SYSTEMS
[Fever] : no fever [Earache] : no earache [Postnasal Drip] : postnasal drip [Hoarseness] : hoarseness [Suicidal] : not suicidal [Negative] : Genitourinary

## 2024-09-30 NOTE — HEALTH RISK ASSESSMENT
[Never] : Never [Employed] : employed [Single] : single [Alone] : lives alone [No] : In the past 12 months have you used drugs other than those required for medical reasons? No [No falls in past year] : Patient reported no falls in the past year [0] : 2) Feeling down, depressed, or hopeless: Not at all (0) [PHQ-2 Negative - No further assessment needed] : PHQ-2 Negative - No further assessment needed [Patient reported colonoscopy was normal] : Patient reported colonoscopy was normal [Independent] : feeding [[id number]] : 0 [ColonoscopyDate] : 2023 [de-identified] :  mother [place], siblings other states, has friend in [place].  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

## 2024-09-30 NOTE — HISTORY OF PRESENT ILLNESS
[FreeTextEntry1] : CPE  [de-identified] : 58 yo male PMH Autism presenting today for CPE.   He reports he has followed up with ENT regarding changes in his voice - was given antacid, told of PND, on nasal spray, has seen improvement, has a follow-up in 1 week.

## 2024-09-30 NOTE — ASSESSMENT
Message left on identified voice mail that fax was received from [facility] and Office notes and order were faxed to them..Received confirmation that fax was received.   [FreeTextEntry1] : 58 yo male PMH Autism presenting today for CPE.

## 2024-09-30 NOTE — PHYSICAL EXAM
[Normal Outer Ear/Nose] : the outer ears and nose were normal in appearance [Normal Oropharynx] : the oropharynx was normal [Normal] : normal rate, regular rhythm, normal S1 and S2 and no murmur heard [Soft] : abdomen soft [Non Tender] : non-tender [Coordination Grossly Intact] : coordination grossly intact [Memory Grossly Normal] : memory grossly normal

## 2024-09-30 NOTE — HISTORY OF PRESENT ILLNESS
[FreeTextEntry1] : CPE  [de-identified] : 60 yo male PMH Autism presenting today for CPE.   He reports he has followed up with ENT regarding changes in his voice - was given antacid, told of PND, on nasal spray, has seen improvement, has a follow-up in 1 week.

## 2024-10-01 DIAGNOSIS — D69.6 THROMBOCYTOPENIA, UNSPECIFIED: ICD-10-CM

## 2024-10-01 LAB
25(OH)D3 SERPL-MCNC: 29 NG/ML
ALBUMIN SERPL ELPH-MCNC: 4.4 G/DL
ALP BLD-CCNC: 92 U/L
ALT SERPL-CCNC: 18 U/L
ANION GAP SERPL CALC-SCNC: 13 MMOL/L
AST SERPL-CCNC: 21 U/L
BASOPHILS # BLD AUTO: 0.04 K/UL
BASOPHILS NFR BLD AUTO: 0.7 %
BILIRUB SERPL-MCNC: 0.3 MG/DL
BUN SERPL-MCNC: 19 MG/DL
CALCIUM SERPL-MCNC: 9.4 MG/DL
CHLORIDE SERPL-SCNC: 103 MMOL/L
CHOLEST SERPL-MCNC: 135 MG/DL
CO2 SERPL-SCNC: 26 MMOL/L
CREAT SERPL-MCNC: 0.87 MG/DL
EGFR: 100 ML/MIN/1.73M2
EOSINOPHIL # BLD AUTO: 0.32 K/UL
EOSINOPHIL NFR BLD AUTO: 5.7 %
ESTIMATED AVERAGE GLUCOSE: 103 MG/DL
GLUCOSE SERPL-MCNC: 65 MG/DL
HBA1C MFR BLD HPLC: 5.2 %
HCT VFR BLD CALC: 40 %
HDLC SERPL-MCNC: 36 MG/DL
HGB BLD-MCNC: 13.6 G/DL
IMM GRANULOCYTES NFR BLD AUTO: 0 %
LDLC SERPL CALC-MCNC: 75 MG/DL
LYMPHOCYTES # BLD AUTO: 1.52 K/UL
LYMPHOCYTES NFR BLD AUTO: 27 %
MAN DIFF?: NORMAL
MCHC RBC-ENTMCNC: 30.2 PG
MCHC RBC-ENTMCNC: 34 GM/DL
MCV RBC AUTO: 88.9 FL
MONOCYTES # BLD AUTO: 0.3 K/UL
MONOCYTES NFR BLD AUTO: 5.3 %
NEUTROPHILS # BLD AUTO: 3.45 K/UL
NEUTROPHILS NFR BLD AUTO: 61.3 %
NONHDLC SERPL-MCNC: 99 MG/DL
PLATELET # BLD AUTO: 125 K/UL
POTASSIUM SERPL-SCNC: 4.7 MMOL/L
PROT SERPL-MCNC: 6.7 G/DL
PSA FREE FLD-MCNC: 35 %
PSA FREE SERPL-MCNC: 0.19 NG/ML
PSA SERPL-MCNC: 0.56 NG/ML
RBC # BLD: 4.5 M/UL
RBC # FLD: 12.8 %
SODIUM SERPL-SCNC: 141 MMOL/L
TRIGL SERPL-MCNC: 132 MG/DL
TSH SERPL-ACNC: 0.96 UIU/ML
WBC # FLD AUTO: 5.63 K/UL

## 2024-10-14 ENCOUNTER — APPOINTMENT (OUTPATIENT)
Dept: ORTHOPEDIC SURGERY | Facility: CLINIC | Age: 59
End: 2024-10-14
Payer: MEDICARE

## 2024-10-14 VITALS — HEIGHT: 72 IN | BODY MASS INDEX: 21.94 KG/M2 | WEIGHT: 162 LBS

## 2024-10-14 DIAGNOSIS — S76.219A STRAIN OF ADDUCTOR MUSCLE, FASCIA AND TENDON OF UNSPECIFIED THIGH, INITIAL ENCOUNTER: ICD-10-CM

## 2024-10-14 DIAGNOSIS — S76.111D STRAIN OF RIGHT QUADRICEPS MUSCLE, FASCIA AND TENDON, SUBSEQUENT ENCOUNTER: ICD-10-CM

## 2024-10-14 DIAGNOSIS — S76.111A STRAIN OF RIGHT QUADRICEPS MUSCLE, FASCIA AND TENDON, INITIAL ENCOUNTER: ICD-10-CM

## 2024-10-14 PROCEDURE — 99213 OFFICE O/P EST LOW 20 MIN: CPT

## 2024-10-18 NOTE — H&P PST ADULT - RS GEN PE MLT RESP DETAILS PC
Intubation    Date/Time: 10/18/2024 7:45 AM    Performed by: Rose Deshpande  Authorized by: Rory Goldberg MD    Intubation:     Induction:  Intravenous    Intubated:  Postinduction    Mask Ventilation:  Easy mask    Attempts:  1    Attempted By:  CRNA    Method of Intubation:  Video laryngoscopy    Blade:  Cronin 4    Laryngeal View Grade: Grade I - full view of cords      Difficult Airway Encountered?: No      Complications:  None    Airway Device:  Oral endotracheal tube    Airway Device Size:  7.5    Style/Cuff Inflation:  Cuffed    Inflation Amount (mL):  10    Tube secured:  22    Secured at:  The teeth    Placement Verified By:  Capnometry and Fiber optic visualization    Complicating Factors:  None    Findings Post-Intubation:  BS equal bilateral and atraumatic/condition of teeth unchanged       clear to auscultation bilaterally

## 2024-11-18 ENCOUNTER — APPOINTMENT (OUTPATIENT)
Dept: DERMATOLOGY | Facility: CLINIC | Age: 59
End: 2024-11-18
Payer: MEDICARE

## 2024-11-18 DIAGNOSIS — L65.9 NONSCARRING HAIR LOSS, UNSPECIFIED: ICD-10-CM

## 2024-11-18 PROCEDURE — 99203 OFFICE O/P NEW LOW 30 MIN: CPT

## 2024-11-29 ENCOUNTER — NON-APPOINTMENT (OUTPATIENT)
Age: 59
End: 2024-11-29

## 2025-01-20 ENCOUNTER — NON-APPOINTMENT (OUTPATIENT)
Age: 60
End: 2025-01-20

## 2025-01-20 ENCOUNTER — APPOINTMENT (OUTPATIENT)
Dept: ALLERGY | Facility: CLINIC | Age: 60
End: 2025-01-20
Payer: MEDICARE

## 2025-01-20 VITALS
OXYGEN SATURATION: 97 % | SYSTOLIC BLOOD PRESSURE: 108 MMHG | HEART RATE: 61 BPM | TEMPERATURE: 98.1 F | RESPIRATION RATE: 14 BRPM | DIASTOLIC BLOOD PRESSURE: 65 MMHG

## 2025-01-20 PROCEDURE — 95004 PERQ TESTS W/ALRGNC XTRCS: CPT

## 2025-01-20 PROCEDURE — 99204 OFFICE O/P NEW MOD 45 MIN: CPT | Mod: 25

## 2025-01-28 ENCOUNTER — APPOINTMENT (OUTPATIENT)
Dept: ALLERGY | Facility: CLINIC | Age: 60
End: 2025-01-28

## 2025-02-14 ENCOUNTER — NON-APPOINTMENT (OUTPATIENT)
Age: 60
End: 2025-02-14

## 2025-02-24 ENCOUNTER — APPOINTMENT (OUTPATIENT)
Dept: ORTHOPEDIC SURGERY | Facility: CLINIC | Age: 60
End: 2025-02-24
Payer: MEDICARE

## 2025-02-24 DIAGNOSIS — M54.16 RADICULOPATHY, LUMBAR REGION: ICD-10-CM

## 2025-02-24 DIAGNOSIS — M16.0 BILATERAL PRIMARY OSTEOARTHRITIS OF HIP: ICD-10-CM

## 2025-02-24 PROCEDURE — 99214 OFFICE O/P EST MOD 30 MIN: CPT

## 2025-04-22 ENCOUNTER — APPOINTMENT (OUTPATIENT)
Dept: ORTHOPEDIC SURGERY | Facility: CLINIC | Age: 60
End: 2025-04-22
Payer: MEDICARE

## 2025-04-22 DIAGNOSIS — M16.0 BILATERAL PRIMARY OSTEOARTHRITIS OF HIP: ICD-10-CM

## 2025-04-22 DIAGNOSIS — M48.061 SPINAL STENOSIS, LUMBAR REGION WITHOUT NEUROGENIC CLAUDICATION: ICD-10-CM

## 2025-04-22 PROCEDURE — 99214 OFFICE O/P EST MOD 30 MIN: CPT

## 2025-06-03 NOTE — ASSESSMENT
Routing to Dr. Brunson for review and advisement.   [FreeTextEntry1] : reassruance\par will follow back with ortho

## 2025-06-25 ENCOUNTER — APPOINTMENT (OUTPATIENT)
Dept: ORTHOPEDIC SURGERY | Facility: CLINIC | Age: 60
End: 2025-06-25
Payer: MEDICARE

## 2025-06-25 PROCEDURE — 99214 OFFICE O/P EST MOD 30 MIN: CPT

## 2025-06-25 RX ORDER — DICLOFENAC SODIUM 75 MG/1
75 TABLET, DELAYED RELEASE ORAL
Qty: 60 | Refills: 0 | Status: ACTIVE | COMMUNITY
Start: 2025-06-25 | End: 1900-01-01

## 2025-07-03 ENCOUNTER — APPOINTMENT (OUTPATIENT)
Dept: ORTHOPEDIC SURGERY | Facility: CLINIC | Age: 60
End: 2025-07-03
Payer: MEDICARE

## 2025-07-03 PROBLEM — M25.552 LEFT HIP PAIN: Status: ACTIVE | Noted: 2025-07-03

## 2025-07-03 PROCEDURE — 73502 X-RAY EXAM HIP UNI 2-3 VIEWS: CPT | Mod: LT

## 2025-07-03 PROCEDURE — 20610 DRAIN/INJ JOINT/BURSA W/O US: CPT | Mod: LT

## 2025-07-03 PROCEDURE — 99214 OFFICE O/P EST MOD 30 MIN: CPT | Mod: 25

## 2025-07-23 ENCOUNTER — RX RENEWAL (OUTPATIENT)
Age: 60
End: 2025-07-23

## 2025-07-23 ENCOUNTER — APPOINTMENT (OUTPATIENT)
Dept: FAMILY MEDICINE | Facility: CLINIC | Age: 60
End: 2025-07-23
Payer: MEDICARE

## 2025-07-23 VITALS
TEMPERATURE: 97.9 F | HEIGHT: 72 IN | SYSTOLIC BLOOD PRESSURE: 94 MMHG | HEART RATE: 60 BPM | WEIGHT: 152 LBS | OXYGEN SATURATION: 96 % | DIASTOLIC BLOOD PRESSURE: 68 MMHG | RESPIRATION RATE: 12 BRPM | BODY MASS INDEX: 20.59 KG/M2

## 2025-07-23 DIAGNOSIS — M25.673 STIFFNESS OF UNSPECIFIED ANKLE, NOT ELSEWHERE CLASSIFIED: ICD-10-CM

## 2025-07-23 PROCEDURE — 99213 OFFICE O/P EST LOW 20 MIN: CPT

## 2025-07-24 ENCOUNTER — APPOINTMENT (OUTPATIENT)
Dept: ORTHOPEDIC SURGERY | Facility: CLINIC | Age: 60
End: 2025-07-24
Payer: MEDICARE

## 2025-07-24 VITALS — BODY MASS INDEX: 20.59 KG/M2 | WEIGHT: 152 LBS | HEIGHT: 72 IN

## 2025-07-24 DIAGNOSIS — M25.672 STIFFNESS OF LEFT ANKLE, NOT ELSEWHERE CLASSIFIED: ICD-10-CM

## 2025-07-24 PROCEDURE — 73610 X-RAY EXAM OF ANKLE: CPT | Mod: LT

## 2025-07-24 PROCEDURE — 99203 OFFICE O/P NEW LOW 30 MIN: CPT

## 2025-07-30 ENCOUNTER — APPOINTMENT (OUTPATIENT)
Dept: ORTHOPEDIC SURGERY | Facility: CLINIC | Age: 60
End: 2025-07-30

## 2025-07-30 ENCOUNTER — APPOINTMENT (OUTPATIENT)
Dept: ORTHOPEDIC SURGERY | Facility: CLINIC | Age: 60
End: 2025-07-30
Payer: MEDICARE

## 2025-07-30 DIAGNOSIS — M76.30 ILIOTIBIAL BAND SYNDROME, UNSPECIFIED LEG: ICD-10-CM

## 2025-07-30 DIAGNOSIS — M25.852 OTHER SPECIFIED JOINT DISORDERS, LEFT HIP: ICD-10-CM

## 2025-07-30 DIAGNOSIS — M48.061 SPINAL STENOSIS, LUMBAR REGION WITHOUT NEUROGENIC CLAUDICATION: ICD-10-CM

## 2025-07-30 PROCEDURE — 99213 OFFICE O/P EST LOW 20 MIN: CPT

## 2025-08-17 ENCOUNTER — EMERGENCY (EMERGENCY)
Facility: HOSPITAL | Age: 60
LOS: 1 days | End: 2025-08-17
Attending: INTERNAL MEDICINE | Admitting: INTERNAL MEDICINE
Payer: OTHER GOVERNMENT

## 2025-08-17 VITALS
TEMPERATURE: 98 F | SYSTOLIC BLOOD PRESSURE: 103 MMHG | RESPIRATION RATE: 18 BRPM | HEART RATE: 62 BPM | OXYGEN SATURATION: 98 % | DIASTOLIC BLOOD PRESSURE: 66 MMHG

## 2025-08-17 VITALS
RESPIRATION RATE: 16 BRPM | WEIGHT: 160.06 LBS | SYSTOLIC BLOOD PRESSURE: 105 MMHG | DIASTOLIC BLOOD PRESSURE: 67 MMHG | OXYGEN SATURATION: 97 % | HEIGHT: 72 IN | TEMPERATURE: 98 F | HEART RATE: 66 BPM

## 2025-08-17 DIAGNOSIS — S02.609A FRACTURE OF MANDIBLE, UNSPECIFIED, INITIAL ENCOUNTER FOR CLOSED FRACTURE: Chronic | ICD-10-CM

## 2025-08-17 DIAGNOSIS — Z96.0 PRESENCE OF UROGENITAL IMPLANTS: Chronic | ICD-10-CM

## 2025-08-17 DIAGNOSIS — M77.32 CALCANEAL SPUR, LEFT FOOT: Chronic | ICD-10-CM

## 2025-08-17 DIAGNOSIS — Z98.890 OTHER SPECIFIED POSTPROCEDURAL STATES: Chronic | ICD-10-CM

## 2025-08-17 PROCEDURE — 71250 CT THORAX DX C-: CPT

## 2025-08-17 PROCEDURE — 71250 CT THORAX DX C-: CPT | Mod: 26

## 2025-08-17 PROCEDURE — 99284 EMERGENCY DEPT VISIT MOD MDM: CPT | Mod: 25

## 2025-08-17 PROCEDURE — 99284 EMERGENCY DEPT VISIT MOD MDM: CPT

## 2025-08-17 RX ORDER — LIDOCAINE HYDROCHLORIDE 20 MG/ML
1 JELLY TOPICAL ONCE
Refills: 0 | Status: COMPLETED | OUTPATIENT
Start: 2025-08-17 | End: 2025-08-17

## 2025-08-17 RX ADMIN — LIDOCAINE HYDROCHLORIDE 1 PATCH: 20 JELLY TOPICAL at 10:38

## 2025-08-19 ENCOUNTER — APPOINTMENT (OUTPATIENT)
Dept: FAMILY MEDICINE | Facility: CLINIC | Age: 60
End: 2025-08-19

## 2025-08-27 ENCOUNTER — APPOINTMENT (OUTPATIENT)
Dept: FAMILY MEDICINE | Facility: CLINIC | Age: 60
End: 2025-08-27
Payer: MEDICARE

## 2025-08-27 VITALS
TEMPERATURE: 97.3 F | HEIGHT: 72 IN | RESPIRATION RATE: 12 BRPM | HEART RATE: 60 BPM | WEIGHT: 162 LBS | SYSTOLIC BLOOD PRESSURE: 98 MMHG | DIASTOLIC BLOOD PRESSURE: 70 MMHG | BODY MASS INDEX: 21.94 KG/M2 | OXYGEN SATURATION: 98 %

## 2025-08-27 DIAGNOSIS — R93.89 ABNORMAL FINDINGS ON DIAGNOSTIC IMAGING OF OTHER SPECIFIED BODY STRUCTURES: ICD-10-CM

## 2025-08-27 DIAGNOSIS — K76.9 LIVER DISEASE, UNSPECIFIED: ICD-10-CM

## 2025-08-27 PROBLEM — R07.81 RIB PAIN: Status: ACTIVE | Noted: 2025-08-27

## 2025-08-27 PROCEDURE — G2211 COMPLEX E/M VISIT ADD ON: CPT

## 2025-08-27 PROCEDURE — 99214 OFFICE O/P EST MOD 30 MIN: CPT

## 2025-08-29 ENCOUNTER — APPOINTMENT (OUTPATIENT)
Facility: CLINIC | Age: 60
End: 2025-08-29
Payer: MEDICARE

## 2025-08-29 VITALS
RESPIRATION RATE: 16 BRPM | TEMPERATURE: 97.6 F | BODY MASS INDEX: 22.23 KG/M2 | OXYGEN SATURATION: 99 % | HEART RATE: 75 BPM | WEIGHT: 164.13 LBS | DIASTOLIC BLOOD PRESSURE: 68 MMHG | HEIGHT: 72 IN | SYSTOLIC BLOOD PRESSURE: 105 MMHG

## 2025-08-29 DIAGNOSIS — J98.11 ATELECTASIS: ICD-10-CM

## 2025-08-29 DIAGNOSIS — S29.9XXA UNSPECIFIED INJURY OF THORAX, INITIAL ENCOUNTER: ICD-10-CM

## 2025-08-29 DIAGNOSIS — R91.8 OTHER NONSPECIFIC ABNORMAL FINDING OF LUNG FIELD: ICD-10-CM

## 2025-08-29 PROCEDURE — 99203 OFFICE O/P NEW LOW 30 MIN: CPT

## 2025-09-03 ENCOUNTER — APPOINTMENT (OUTPATIENT)
Dept: FAMILY MEDICINE | Facility: CLINIC | Age: 60
End: 2025-09-03
Payer: MEDICARE

## 2025-09-03 VITALS
RESPIRATION RATE: 12 BRPM | HEART RATE: 61 BPM | SYSTOLIC BLOOD PRESSURE: 102 MMHG | OXYGEN SATURATION: 98 % | TEMPERATURE: 97.6 F | WEIGHT: 164 LBS | BODY MASS INDEX: 22.21 KG/M2 | DIASTOLIC BLOOD PRESSURE: 72 MMHG | HEIGHT: 72 IN

## 2025-09-03 DIAGNOSIS — K21.9 GASTRO-ESOPHAGEAL REFLUX DISEASE W/OUT ESOPHAGITIS: ICD-10-CM

## 2025-09-03 DIAGNOSIS — R49.0 DYSPHONIA: ICD-10-CM

## 2025-09-03 DIAGNOSIS — R07.81 PLEURODYNIA: ICD-10-CM

## 2025-09-03 PROCEDURE — G2211 COMPLEX E/M VISIT ADD ON: CPT

## 2025-09-03 PROCEDURE — 99214 OFFICE O/P EST MOD 30 MIN: CPT

## 2025-09-06 ENCOUNTER — EMERGENCY (EMERGENCY)
Facility: HOSPITAL | Age: 60
LOS: 1 days | End: 2025-09-06
Attending: STUDENT IN AN ORGANIZED HEALTH CARE EDUCATION/TRAINING PROGRAM | Admitting: STUDENT IN AN ORGANIZED HEALTH CARE EDUCATION/TRAINING PROGRAM
Payer: MEDICARE

## 2025-09-06 VITALS
HEART RATE: 74 BPM | RESPIRATION RATE: 19 BRPM | HEIGHT: 72 IN | DIASTOLIC BLOOD PRESSURE: 65 MMHG | OXYGEN SATURATION: 96 % | SYSTOLIC BLOOD PRESSURE: 99 MMHG | WEIGHT: 160.06 LBS | TEMPERATURE: 97 F

## 2025-09-06 DIAGNOSIS — S02.609A FRACTURE OF MANDIBLE, UNSPECIFIED, INITIAL ENCOUNTER FOR CLOSED FRACTURE: Chronic | ICD-10-CM

## 2025-09-06 DIAGNOSIS — Z98.890 OTHER SPECIFIED POSTPROCEDURAL STATES: Chronic | ICD-10-CM

## 2025-09-06 DIAGNOSIS — Z96.0 PRESENCE OF UROGENITAL IMPLANTS: Chronic | ICD-10-CM

## 2025-09-06 DIAGNOSIS — M77.32 CALCANEAL SPUR, LEFT FOOT: Chronic | ICD-10-CM

## 2025-09-06 PROCEDURE — 99283 EMERGENCY DEPT VISIT LOW MDM: CPT

## 2025-09-06 PROCEDURE — 99282 EMERGENCY DEPT VISIT SF MDM: CPT

## 2025-09-10 ENCOUNTER — APPOINTMENT (OUTPATIENT)
Facility: CLINIC | Age: 60
End: 2025-09-10
Payer: MEDICARE

## 2025-09-10 VITALS
HEART RATE: 54 BPM | BODY MASS INDEX: 22.08 KG/M2 | SYSTOLIC BLOOD PRESSURE: 103 MMHG | TEMPERATURE: 97.8 F | WEIGHT: 163 LBS | DIASTOLIC BLOOD PRESSURE: 64 MMHG | HEIGHT: 72 IN | RESPIRATION RATE: 14 BRPM | OXYGEN SATURATION: 99 %

## 2025-09-10 DIAGNOSIS — J98.11 ATELECTASIS: ICD-10-CM

## 2025-09-10 DIAGNOSIS — R93.89 ABNORMAL FINDINGS ON DIAGNOSTIC IMAGING OF OTHER SPECIFIED BODY STRUCTURES: ICD-10-CM

## 2025-09-10 DIAGNOSIS — S29.9XXA UNSPECIFIED INJURY OF THORAX, INITIAL ENCOUNTER: ICD-10-CM

## 2025-09-10 DIAGNOSIS — R91.8 OTHER NONSPECIFIC ABNORMAL FINDING OF LUNG FIELD: ICD-10-CM

## 2025-09-10 PROCEDURE — 99213 OFFICE O/P EST LOW 20 MIN: CPT
